# Patient Record
Sex: FEMALE | Race: WHITE | NOT HISPANIC OR LATINO | Employment: UNEMPLOYED | URBAN - NONMETROPOLITAN AREA
[De-identification: names, ages, dates, MRNs, and addresses within clinical notes are randomized per-mention and may not be internally consistent; named-entity substitution may affect disease eponyms.]

---

## 2019-08-23 RX ORDER — LEVOTHYROXINE SODIUM 0.1 MG/1
100 TABLET ORAL DAILY
COMMUNITY

## 2019-08-23 RX ORDER — ESZOPICLONE 2 MG/1
3 TABLET, FILM COATED ORAL NIGHTLY
COMMUNITY

## 2019-08-23 RX ORDER — LANSOPRAZOLE 15 MG/1
15 CAPSULE, DELAYED RELEASE ORAL DAILY
Status: ON HOLD | COMMUNITY
End: 2023-03-10

## 2019-08-23 RX ORDER — FLUOXETINE HYDROCHLORIDE 20 MG/1
20 CAPSULE ORAL DAILY
Status: ON HOLD | COMMUNITY
End: 2023-03-10

## 2019-08-23 RX ORDER — AMITRIPTYLINE HYDROCHLORIDE 50 MG/1
150 TABLET, FILM COATED ORAL NIGHTLY
COMMUNITY

## 2019-08-23 RX ORDER — METOPROLOL TARTRATE 50 MG/1
50 TABLET, FILM COATED ORAL 2 TIMES DAILY
COMMUNITY
End: 2019-09-04 | Stop reason: ALTCHOICE

## 2019-09-03 NOTE — PROGRESS NOTES
TRAMAINE Vaca  [facility]   Respiratory Disease Clinic  [address] [place] [address]  Phone: [phone]  Fax: [phone]     Marcella Carlisle is a 49 y.o. female.   CC:   Chief Complaint   Patient presents with   • follow up on chronic bronchitis        HPI: She is here for her annual visit with a history of frequent bronchitis in a non-smoker.  She takes Augmentin as needed.  She has not had any recent infections but does request a prescription for antibiotics should she need them throughout the year.  She also tends to get bacterial vaginosis when she takes antibiotics so she request a prescription for MetroGel to have on hand.  She is followed by a primary care provider in [place], [place].  She does not take flu or pneumonia vaccines.    The following portions of the patient's history were reviewed and updated as appropriate: allergies, current medications, past family history, past medical history, past social history, past surgical history and problem list.    Past Medical History:   Diagnosis Date   • Elevated cholesterol    • Fibromyalgia    • GERD (gastroesophageal reflux disease)    • Heartburn    • Stroke (CMS/HCC)        Family History   Problem Relation Age of Onset   • Cancer Mother    • Cancer Father    • Hypertension Father        Social History     Socioeconomic History   • Marital status:      Spouse name: Not on file   • Number of children: Not on file   • Years of education: Not on file   • Highest education level: Not on file   Tobacco Use   • Smoking status: Never Smoker   • Smokeless tobacco: Never Used   Substance and Sexual Activity   • Alcohol use: No     Frequency: Never   • Drug use: No   • Sexual activity: Defer       Review of Systems   Constitutional: Negative for appetite change, chills, fatigue and fever.   HENT: Negative for swollen glands, trouble swallowing and voice change.    Eyes: Negative for visual disturbance.   Respiratory:  "Negative for cough, shortness of breath and wheezing.    Cardiovascular: Negative for chest pain and leg swelling.   Gastrointestinal: Negative for abdominal distention, abdominal pain, nausea and vomiting.   Genitourinary: Negative.    Musculoskeletal: Negative for gait problem and myalgias.   Skin: Negative.    Neurological: Negative for weakness.   Hematological: Negative.    Psychiatric/Behavioral: The patient is not nervous/anxious.        /70   Pulse 60   Ht 160 cm (63\")   Wt 54.4 kg (120 lb)   SpO2 98% Comment: RA  Breastfeeding? No   BMI 21.26 kg/m²     Physical Exam   Constitutional: She is oriented to person, place, and time. She appears well-developed and well-nourished. No distress.   HENT:   Head: Normocephalic and atraumatic.   Eyes: Pupils are equal, round, and reactive to light. No scleral icterus.   Neck: Normal range of motion. Neck supple.   Cardiovascular: Normal rate, regular rhythm, S1 normal and S2 normal.   Pulmonary/Chest: Effort normal and breath sounds normal. No tachypnea. She has no wheezes. She has no rhonchi. She has no rales.   Abdominal: Soft. Bowel sounds are normal. She exhibits no distension.   Musculoskeletal: Normal range of motion. She exhibits no edema.   Lymphadenopathy:     She has no cervical adenopathy.   Neurological: She is alert and oriented to person, place, and time.   Skin: Skin is warm and dry. No rash noted. No cyanosis. Nails show no clubbing.   Psychiatric: She has a normal mood and affect. Her behavior is normal.   Vitals reviewed.      My interpretation of  Pulmonary Functions Testing: Spirometry is normal with FVC and FEV1 being supranormal.  When compared to pulmonary function testing from 2014, the patient's lung function has remained the same.    FEV1   Date Value Ref Range Status   09/04/2019 123% liters Final     FVC   Date Value Ref Range Status   09/04/2019 125% liters Final     FEV1/FVC   Date Value Ref Range Status   09/04/2019 80.79 % " Final       Marcella was seen today for follow up on chronic bronchitis.    Diagnoses and all orders for this visit:    Simple chronic bronchitis (CMS/HCC)  -     Pulmonary Function Test  -     amoxicillin-clavulanate (AUGMENTIN) 875-125 MG per tablet; Take 1 tablet by mouth 2 (Two) Times a Day for 10 days.  -     metroNIDAZOLE (METROGEL VAGINAL) 0.75 % vaginal gel; Insert  into the vagina 2 (Two) Times a Day.      Patient's Body mass index is 21.26 kg/m². BMI is within normal parameters. No follow-up required..      Follow-up/Plan: Augmentin as needed for bronchitis.  The patient would like to continue on annual visits.  Return to clinic in 1 year.    Mahin Salazar, APRN  9/4/2019  5:14 PM

## 2019-09-04 ENCOUNTER — OFFICE VISIT (OUTPATIENT)
Dept: PULMONOLOGY | Facility: CLINIC | Age: 49
End: 2019-09-04

## 2019-09-04 VITALS
DIASTOLIC BLOOD PRESSURE: 70 MMHG | WEIGHT: 120 LBS | OXYGEN SATURATION: 98 % | SYSTOLIC BLOOD PRESSURE: 116 MMHG | BODY MASS INDEX: 21.26 KG/M2 | HEART RATE: 60 BPM | HEIGHT: 63 IN

## 2019-09-04 DIAGNOSIS — J41.0 SIMPLE CHRONIC BRONCHITIS (HCC): Primary | ICD-10-CM

## 2019-09-04 PROBLEM — M79.7 FIBROMYALGIA: Status: ACTIVE | Noted: 2019-09-04

## 2019-09-04 LAB
FEV1/FVC: 80.79 %
FEV1: NORMAL LITERS
FVC VOL RESPIRATORY: NORMAL LITERS

## 2019-09-04 PROCEDURE — 94010 BREATHING CAPACITY TEST: CPT | Performed by: NURSE PRACTITIONER

## 2019-09-04 PROCEDURE — 99213 OFFICE O/P EST LOW 20 MIN: CPT | Performed by: NURSE PRACTITIONER

## 2019-09-04 RX ORDER — AMOXICILLIN AND CLAVULANATE POTASSIUM 875; 125 MG/1; MG/1
1 TABLET, FILM COATED ORAL 2 TIMES DAILY
Qty: 20 TABLET | Refills: 3 | Status: SHIPPED | OUTPATIENT
Start: 2019-09-04 | End: 2019-09-14

## 2019-09-04 RX ORDER — HYDROCODONE BITARTRATE AND ACETAMINOPHEN 10; 325 MG/1; MG/1
1 TABLET ORAL 4 TIMES DAILY
COMMUNITY
Start: 2019-09-03

## 2019-09-04 RX ORDER — ATENOLOL 50 MG/1
50 TABLET ORAL DAILY
Refills: 2 | Status: ON HOLD | COMMUNITY
Start: 2019-06-21 | End: 2023-03-10

## 2019-09-04 RX ORDER — METRONIDAZOLE 7.5 MG/G
GEL VAGINAL 2 TIMES DAILY
Qty: 1 TUBE | Refills: 3 | Status: SHIPPED | OUTPATIENT
Start: 2019-09-04 | End: 2020-09-09 | Stop reason: SDUPTHER

## 2019-09-04 ASSESSMENT — PULMONARY FUNCTION TESTS: FEV1/FVC: 80.79

## 2020-05-29 ENCOUNTER — OFFICE VISIT (OUTPATIENT)
Dept: PRIMARY CARE CLINIC | Age: 50
End: 2020-05-29
Payer: COMMERCIAL

## 2020-05-29 VITALS — OXYGEN SATURATION: 98 % | HEART RATE: 61 BPM | TEMPERATURE: 97.1 F

## 2020-05-29 DIAGNOSIS — R30.0 DYSURIA: ICD-10-CM

## 2020-05-29 DIAGNOSIS — R10.2 PELVIC PAIN: ICD-10-CM

## 2020-05-29 LAB
BILIRUBIN, POC: NEGATIVE
BLOOD URINE, POC: NORMAL
CLARITY, POC: CLEAR
COLOR, POC: YELLOW
GLUCOSE URINE, POC: NEGATIVE
KETONES, POC: NEGATIVE
LEUKOCYTE EST, POC: NORMAL
NITRITE, POC: NEGATIVE
PH, POC: 6
PROTEIN, POC: NEGATIVE
SPECIFIC GRAVITY, POC: 1.02
UROBILINOGEN, POC: 0.2

## 2020-05-29 PROCEDURE — 99213 OFFICE O/P EST LOW 20 MIN: CPT | Performed by: NURSE PRACTITIONER

## 2020-05-29 PROCEDURE — 81002 URINALYSIS NONAUTO W/O SCOPE: CPT | Performed by: NURSE PRACTITIONER

## 2020-05-29 RX ORDER — BUTALBITAL, ACETAMINOPHEN AND CAFFEINE 50; 325; 40 MG/1; MG/1; MG/1
1 TABLET ORAL EVERY 4 HOURS PRN
COMMUNITY

## 2020-05-29 RX ORDER — LEVOTHYROXINE SODIUM 0.05 MG/1
50 TABLET ORAL EVERY OTHER DAY
COMMUNITY

## 2020-05-29 RX ORDER — ASPIRIN 81 MG/1
81 TABLET, CHEWABLE ORAL DAILY
COMMUNITY

## 2020-05-29 RX ORDER — ATENOLOL 50 MG/1
50 TABLET ORAL DAILY
COMMUNITY
End: 2021-11-11

## 2020-05-29 RX ORDER — AMITRIPTYLINE HYDROCHLORIDE 150 MG/1
150 TABLET, FILM COATED ORAL NIGHTLY
COMMUNITY

## 2020-05-29 RX ORDER — OMEPRAZOLE 10 MG/1
10 CAPSULE, DELAYED RELEASE ORAL DAILY
COMMUNITY
End: 2022-09-02

## 2020-05-29 RX ORDER — PREGABALIN 50 MG/1
50 CAPSULE ORAL 3 TIMES DAILY
COMMUNITY
End: 2021-11-11

## 2020-05-29 RX ORDER — LEVOTHYROXINE SODIUM 0.07 MG/1
75 TABLET ORAL EVERY OTHER DAY
COMMUNITY

## 2020-05-29 RX ORDER — ESZOPICLONE 3 MG/1
3 TABLET, FILM COATED ORAL NIGHTLY
COMMUNITY
End: 2022-09-02

## 2020-05-29 RX ORDER — ESCITALOPRAM OXALATE 20 MG/1
20 TABLET ORAL DAILY
COMMUNITY
End: 2022-07-06

## 2020-05-29 RX ORDER — CIPROFLOXACIN 500 MG/1
500 TABLET, FILM COATED ORAL 2 TIMES DAILY
Qty: 6 TABLET | Refills: 0 | Status: SHIPPED | OUTPATIENT
Start: 2020-05-29 | End: 2020-06-01

## 2020-05-29 ASSESSMENT — ENCOUNTER SYMPTOMS
SINUS PRESSURE: 0
VOMITING: 0
RHINORRHEA: 0
NAUSEA: 0
COUGH: 0
TROUBLE SWALLOWING: 0
SORE THROAT: 0
CONSTIPATION: 0
DIARRHEA: 0
SHORTNESS OF BREATH: 0

## 2020-05-29 NOTE — PATIENT INSTRUCTIONS
Increase fluids  Avoid tub baths  May take azo for urinary discomfort - if continues may need to see urology. Follow up with your doctor or OB/Gyn  Return for any problems or concerns.

## 2020-05-29 NOTE — PROGRESS NOTES
Results for orders placed or performed in visit on 05/29/20   POCT Urinalysis no Micro   Result Value Ref Range    Color, UA Yellow     Clarity, UA Clear     Glucose, UA POC Negative     Bilirubin, UA Negative     Ketones, UA Negative     Spec Grav, UA 1.020     Blood, UA POC Trace-intact     pH, UA 6.0     Protein, UA POC Negative     Urobilinogen, UA 0.2     Leukocytes, UA Trace     Nitrite, UA Negative

## 2020-05-29 NOTE — PROGRESS NOTES
Cardiovascular:      Rate and Rhythm: Normal rate and regular rhythm. Pulses: Normal pulses. Heart sounds: Normal heart sounds. Pulmonary:      Effort: Pulmonary effort is normal.      Breath sounds: Normal breath sounds. Abdominal:      General: Bowel sounds are normal. There is no distension. Palpations: Abdomen is soft. Tenderness: There is abdominal tenderness (suprapubic). There is no guarding. Skin:     General: Skin is warm. Neurological:      Mental Status: She is alert. Pulse 61   Temp 97.1 °F (36.2 °C) (Infrared)   SpO2 98%   Results for orders placed or performed in visit on 05/29/20   POCT Urinalysis no Micro   Result Value Ref Range    Color, UA Yellow     Clarity, UA Clear     Glucose, UA POC Negative     Bilirubin, UA Negative     Ketones, UA Negative     Spec Grav, UA 1.020     Blood, UA POC Trace-intact     pH, UA 6.0     Protein, UA POC Negative     Urobilinogen, UA 0.2     Leukocytes, UA Trace     Nitrite, UA Negative        Assessment/ Plan     ASSESSMENT:         Diagnosis Orders   1. Dysuria  POCT Urinalysis no Micro    cipro 500mg bid for 3 days. 2. Pelvic pain  POCT Urinalysis no Micro    Urinalysis was negative except for some trace of blood and leukocytes in urine. Given she has had pelvic pain for a month and urinary symptoms started 3 days ago. I offered to do a pelvic exam and get possible CAT scan of her abdomen and pelvis to r/o kidney stone or pelvic problem. Patient declined stating that she will go see her OB/GYN or PCP. Advised her that she could try Azo over-the-counter for her dysuria. I put her on cipro for 3 days and will get urine culture. Stressed the importance of her following up if her symptoms are not improving. PLAN:     Return if symptoms worsen or fail to improve. Patient given educational materials - see patient instructions. Discussed use, benefit, and side effects of prescribed medications.   All patient questions answered. Pt voiced understanding. Patient agreed with treatment plan.  Follow up as needed      Electronically signed by DINA Everett on 5/29/2020 at 9:27 AM

## 2020-05-31 LAB
ORGANISM: ABNORMAL
URINE CULTURE, ROUTINE: ABNORMAL
URINE CULTURE, ROUTINE: ABNORMAL

## 2020-06-02 ENCOUNTER — OFFICE VISIT (OUTPATIENT)
Dept: OBGYN | Age: 50
End: 2020-06-02
Payer: COMMERCIAL

## 2020-06-02 VITALS
WEIGHT: 125 LBS | SYSTOLIC BLOOD PRESSURE: 90 MMHG | TEMPERATURE: 98.3 F | HEIGHT: 63 IN | BODY MASS INDEX: 22.15 KG/M2 | HEART RATE: 67 BPM | DIASTOLIC BLOOD PRESSURE: 69 MMHG

## 2020-06-02 PROCEDURE — 99203 OFFICE O/P NEW LOW 30 MIN: CPT | Performed by: OBSTETRICS & GYNECOLOGY

## 2020-06-02 RX ORDER — ESTRADIOL 0.1 MG/G
1 CREAM VAGINAL DAILY
Qty: 1 TUBE | Refills: 0 | Status: SHIPPED | OUTPATIENT
Start: 2020-06-02 | End: 2020-09-25

## 2020-06-02 SDOH — HEALTH STABILITY: MENTAL HEALTH: HOW OFTEN DO YOU HAVE A DRINK CONTAINING ALCOHOL?: NEVER

## 2020-07-09 ENCOUNTER — OFFICE VISIT (OUTPATIENT)
Dept: OBGYN | Age: 50
End: 2020-07-09
Payer: COMMERCIAL

## 2020-07-09 VITALS
DIASTOLIC BLOOD PRESSURE: 68 MMHG | HEIGHT: 63 IN | WEIGHT: 120 LBS | HEART RATE: 74 BPM | TEMPERATURE: 98.3 F | BODY MASS INDEX: 21.26 KG/M2 | SYSTOLIC BLOOD PRESSURE: 112 MMHG

## 2020-07-09 PROCEDURE — 99213 OFFICE O/P EST LOW 20 MIN: CPT | Performed by: OBSTETRICS & GYNECOLOGY

## 2020-07-09 RX ORDER — CONJUGATED ESTROGENS 0.62 MG/G
0.5 CREAM VAGINAL
Qty: 1 TUBE | Refills: 5 | Status: SHIPPED | OUTPATIENT
Start: 2020-07-09 | End: 2022-01-25

## 2020-07-09 ASSESSMENT — ENCOUNTER SYMPTOMS
RESPIRATORY NEGATIVE: 1
GASTROINTESTINAL NEGATIVE: 1
EYES NEGATIVE: 1

## 2020-07-09 NOTE — PROGRESS NOTES
IAdriana RN, am scribing for and in the presence of Dr. Joseph Escobar 7/9/2020/3:17 PM/sign    Subjective:      Patient ID: Ronald Perez is a 44 y.o. female. CASEY Chen is here for a 6 weeks follow up. She has been using Estrace vaginal cream. Has noticed an improvement to vagina. However, every time she uses the cream she has dizziness. Ronald Perez is a 44 y.o. female with the following history as recorded in [facility]: There are no active problems to display for this patient. Current Outpatient Medications   Medication Sig Dispense Refill    conjugated estrogens (PREMARIN) 0.625 MG/GM vaginal cream Place 0.5 g vaginally Twice a Week 1 Tube 5    estradiol (ESTRACE VAGINAL) 0.1 MG/GM vaginal cream Place 1 g vaginally daily 1 Tube 0    omeprazole (PRILOSEC) 10 MG delayed release capsule Take 10 mg by mouth daily      escitalopram (LEXAPRO) 20 MG tablet Take 20 mg by mouth daily      levothyroxine (SYNTHROID) 50 MCG tablet Take 50 mcg by mouth every other day      levothyroxine (SYNTHROID) 75 MCG tablet Take 75 mcg by mouth every other day      atenolol (TENORMIN) 50 MG tablet Take 50 mg by mouth daily      amitriptyline (ELAVIL) 150 MG tablet Take 150 mg by mouth nightly      pregabalin (LYRICA) 50 MG capsule Take 50 mg by mouth 3 times daily.  eszopiclone (ESZOPICLONE) 3 MG TABS Take 3 mg by mouth nightly.  butalbital-acetaminophen-caffeine (FIORICET, ESGIC) -40 MG per tablet Take 1 tablet by mouth every 4 hours as needed for Headaches      aspirin 81 MG chewable tablet Take 81 mg by mouth daily       No current facility-administered medications for this visit.       Allergies: Clindamycin/lincomycin and Sulfa antibiotics  Past Medical History:   Diagnosis Date    Chronic bronchitis (HCC)     Fibromyalgia     Hyperthyroidism     Mitral valve prolapse     MVP (mitral valve prolapse)     Stroke [facility])      Past Surgical History:   Procedure Laterality Date    oriented to person, place, and time. Motor: No abnormal muscle tone. Coordination: Coordination normal.   Psychiatric:         Behavior: Behavior normal.         Assessment:       Diagnosis Orders   1. Postmenopausal atrophic vaginitis             Plan:      MEDICATIONS:  Orders Placed This Encounter   Medications    conjugated estrogens (PREMARIN) 0.625 MG/GM vaginal cream     Sig: Place 0.5 g vaginally Twice a Week     Dispense:  1 Tube     Refill:  5       ORDERS:  No orders of the defined types were placed in this encounter. Will try Premarin cream instead of Estrace  F/u yearly or prn  All questions answered. Eric Jones MD, personally performed services described in this document as scribed by Theresa Uriarte RN in my presence, and it is both accurate and complete.

## 2020-09-06 NOTE — PROGRESS NOTES
TRAMAINE Vaca  [facility]   Respiratory Disease Clinic  [address] [place] [address]  Phone: [phone]  Fax: [phone]     Marcella Carlisle is a 50 y.o. female.   CC:   Chief Complaint   Patient presents with   • Bronchitis        HPI: She has required antibiotics a couple of times this year for bronchitis.  Currently she is doing well.  She was prescribed Augmentin last year but she would like to go back to plain amoxicillin.  She requires MetroGel with any round of antibiotics so that will be refilled for her today.  She is currently furloughed from work and has been staying at home and practicing social distancing.  She is a never smoker.  She follows with a primary care provider in [facility] and she does not take vaccines.    The following portions of the patient's history were reviewed and updated as appropriate: allergies, current medications, past family history, past medical history, past social history, past surgical history and problem list.    Past Medical History:   Diagnosis Date   • Elevated cholesterol    • Fibromyalgia    • GERD (gastroesophageal reflux disease)    • Heartburn    • Stroke (CMS/HCC)        Family History   Problem Relation Age of Onset   • Cancer Mother    • Cancer Father    • Hypertension Father        Social History     Socioeconomic History   • Marital status:      Spouse name: Not on file   • Number of children: Not on file   • Years of education: Not on file   • Highest education level: Not on file   Tobacco Use   • Smoking status: Never Smoker   • Smokeless tobacco: Never Used   Substance and Sexual Activity   • Alcohol use: No     Frequency: Never   • Drug use: No   • Sexual activity: Defer       Review of Systems   Constitutional: Negative for appetite change, chills, fatigue and fever.   HENT: Negative for swollen glands, trouble swallowing and voice change.    Eyes: Negative for visual disturbance.   Respiratory:  "Positive for cough. Negative for shortness of breath and wheezing.    Cardiovascular: Negative for chest pain and leg swelling.   Gastrointestinal: Negative for abdominal distention, abdominal pain, nausea and vomiting.   Genitourinary: Negative.    Musculoskeletal: Negative for gait problem and myalgias.   Skin: Negative.    Neurological: Negative for weakness.   Hematological: Negative.    Psychiatric/Behavioral: The patient is not nervous/anxious.        /70   Pulse 68   Temp 97.9 °F (36.6 °C)   Ht 160 cm (63\")   Wt 54 kg (119 lb)   SpO2 98% Comment: RA  BMI 21.08 kg/m²     Physical Exam   Constitutional: She is oriented to person, place, and time. She appears well-developed and well-nourished. No distress.   HENT:   Head: Normocephalic and atraumatic.   Wearing mask   Eyes: Pupils are equal, round, and reactive to light. No scleral icterus.   Neck: Normal range of motion. Neck supple.   Cardiovascular: Normal rate, regular rhythm, S1 normal and S2 normal.   Pulmonary/Chest: Effort normal and breath sounds normal. No tachypnea. She has no wheezes. She has no rhonchi. She has no rales.   Abdominal: Soft. Bowel sounds are normal. She exhibits no distension.   Musculoskeletal: Normal range of motion. She exhibits no edema.   Lymphadenopathy:     She has no cervical adenopathy.   Neurological: She is alert and oriented to person, place, and time.   Skin: Skin is warm and dry. No rash noted. No cyanosis. Nails show no clubbing.   Psychiatric: She has a normal mood and affect. Her behavior is normal.   Vitals reviewed.        Marcella was seen today for bronchitis.    Diagnoses and all orders for this visit:    Simple chronic bronchitis (CMS/HCC)  -     amoxicillin (AMOXIL) 500 MG capsule; Take 1 capsule by mouth 2 (Two) Times a Day for 10 days.    Vaginosis  -     metroNIDAZOLE (METROGEL VAGINAL) 0.75 % vaginal gel; Insert  into the vagina 2 (Two) Times a Day.    Other orders  -     nystatin (MYCOSTATIN) 862715 " UNIT/ML suspension; Take 5 mL by mouth 4 (Four) Times a Day.      Patient's Body mass index is 21.08 kg/m². BMI is within normal parameters. No follow-up required.      Follow-up/Plan: Prescriptions for amoxicillin, MetroGel and nystatin were sent to the patient's pharmacy.  She would like to continue to be seen on an annual basis.  Follow-up in 1 year.    Mahin Salazar, TRAMAINE  9/9/2020  16:48

## 2020-09-09 ENCOUNTER — OFFICE VISIT (OUTPATIENT)
Dept: PULMONOLOGY | Facility: CLINIC | Age: 50
End: 2020-09-09

## 2020-09-09 VITALS
TEMPERATURE: 97.9 F | HEART RATE: 68 BPM | DIASTOLIC BLOOD PRESSURE: 70 MMHG | WEIGHT: 119 LBS | SYSTOLIC BLOOD PRESSURE: 102 MMHG | HEIGHT: 63 IN | OXYGEN SATURATION: 98 % | BODY MASS INDEX: 21.09 KG/M2

## 2020-09-09 DIAGNOSIS — J41.0 SIMPLE CHRONIC BRONCHITIS (HCC): Primary | ICD-10-CM

## 2020-09-09 DIAGNOSIS — N76.0 VAGINOSIS: ICD-10-CM

## 2020-09-09 PROCEDURE — 99214 OFFICE O/P EST MOD 30 MIN: CPT | Performed by: NURSE PRACTITIONER

## 2020-09-09 RX ORDER — METRONIDAZOLE 7.5 MG/G
GEL VAGINAL 2 TIMES DAILY
Qty: 1 TUBE | Refills: 3 | Status: SHIPPED | OUTPATIENT
Start: 2020-09-09 | End: 2021-09-22 | Stop reason: SDUPTHER

## 2020-09-09 RX ORDER — AMOXICILLIN 500 MG/1
500 CAPSULE ORAL 2 TIMES DAILY
Qty: 20 CAPSULE | Refills: 3 | Status: SHIPPED | OUTPATIENT
Start: 2020-09-09 | End: 2020-09-19

## 2020-09-25 RX ORDER — ESTRADIOL 0.1 MG/G
CREAM VAGINAL
Qty: 42.5 G | Refills: 0 | Status: SHIPPED | OUTPATIENT
Start: 2020-09-25 | End: 2020-11-04

## 2021-02-08 ENCOUNTER — TRANSCRIBE ORDERS (OUTPATIENT)
Dept: ADMINISTRATIVE | Facility: HOSPITAL | Age: 51
End: 2021-02-08

## 2021-02-08 DIAGNOSIS — E28.39 OVARIAN FAILURE: ICD-10-CM

## 2021-02-08 DIAGNOSIS — G44.89 CHRONIC MIXED HEADACHE SYNDROME: Primary | ICD-10-CM

## 2021-02-16 ENCOUNTER — APPOINTMENT (OUTPATIENT)
Dept: BONE DENSITY | Facility: HOSPITAL | Age: 51
End: 2021-02-16

## 2021-02-16 ENCOUNTER — APPOINTMENT (OUTPATIENT)
Dept: CT IMAGING | Facility: HOSPITAL | Age: 51
End: 2021-02-16

## 2021-02-25 ENCOUNTER — HOSPITAL ENCOUNTER (OUTPATIENT)
Dept: BONE DENSITY | Facility: HOSPITAL | Age: 51
Discharge: HOME | End: 2021-02-25

## 2021-02-25 ENCOUNTER — HOSPITAL ENCOUNTER (OUTPATIENT)
Dept: CT IMAGING | Facility: HOSPITAL | Age: 51
Discharge: HOME | End: 2021-02-25

## 2021-02-25 ENCOUNTER — HOSPITAL ENCOUNTER (OUTPATIENT)
Dept: GENERAL RADIOLOGY | Facility: HOSPITAL | Age: 51
Discharge: HOME | End: 2021-02-25

## 2021-02-25 DIAGNOSIS — G44.89 CHRONIC MIXED HEADACHE SYNDROME: ICD-10-CM

## 2021-02-25 DIAGNOSIS — M54.2 CERVICALGIA: ICD-10-CM

## 2021-02-25 DIAGNOSIS — E28.39 OVARIAN FAILURE: ICD-10-CM

## 2021-02-25 PROCEDURE — 77080 DXA BONE DENSITY AXIAL: CPT

## 2021-02-25 PROCEDURE — 72040 X-RAY EXAM NECK SPINE 2-3 VW: CPT

## 2021-02-25 PROCEDURE — 70450 CT HEAD/BRAIN W/O DYE: CPT

## 2021-04-08 ENCOUNTER — TRANSCRIBE ORDERS (OUTPATIENT)
Dept: ADMINISTRATIVE | Facility: HOSPITAL | Age: 51
End: 2021-04-08

## 2021-04-08 DIAGNOSIS — G89.29 OTHER CHRONIC PAIN: Primary | ICD-10-CM

## 2021-04-23 ENCOUNTER — HOSPITAL ENCOUNTER (OUTPATIENT)
Dept: MRI IMAGING | Facility: HOSPITAL | Age: 51
Discharge: HOME | End: 2021-04-23

## 2021-04-23 DIAGNOSIS — G89.29 OTHER CHRONIC PAIN: ICD-10-CM

## 2021-04-23 PROCEDURE — 72141 MRI NECK SPINE W/O DYE: CPT

## 2021-04-23 PROCEDURE — 72146 MRI CHEST SPINE W/O DYE: CPT

## 2021-09-22 ENCOUNTER — OFFICE VISIT (OUTPATIENT)
Dept: PULMONOLOGY | Facility: CLINIC | Age: 51
End: 2021-09-22

## 2021-09-22 VITALS
WEIGHT: 124.2 LBS | DIASTOLIC BLOOD PRESSURE: 64 MMHG | SYSTOLIC BLOOD PRESSURE: 110 MMHG | OXYGEN SATURATION: 100 % | HEART RATE: 53 BPM | HEIGHT: 63 IN | BODY MASS INDEX: 22.01 KG/M2

## 2021-09-22 DIAGNOSIS — J41.0 SIMPLE CHRONIC BRONCHITIS (HCC): Primary | Chronic | ICD-10-CM

## 2021-09-22 DIAGNOSIS — N76.0 VAGINOSIS: ICD-10-CM

## 2021-09-22 PROCEDURE — 99213 OFFICE O/P EST LOW 20 MIN: CPT | Performed by: NURSE PRACTITIONER

## 2021-09-22 RX ORDER — ESOMEPRAZOLE MAGNESIUM 40 MG/1
40 CAPSULE, DELAYED RELEASE ORAL DAILY
COMMUNITY
Start: 2021-07-31

## 2021-09-22 RX ORDER — METOPROLOL TARTRATE 50 MG/1
50 TABLET, FILM COATED ORAL 3 TIMES DAILY
COMMUNITY
Start: 2021-07-25

## 2021-09-22 RX ORDER — DESVENLAFAXINE SUCCINATE 50 MG/1
TABLET, EXTENDED RELEASE ORAL
COMMUNITY
Start: 2021-08-03

## 2021-09-22 RX ORDER — AMOXICILLIN 500 MG/1
1000 CAPSULE ORAL 3 TIMES DAILY
Qty: 30 CAPSULE | Refills: 3 | Status: SHIPPED | OUTPATIENT
Start: 2021-09-22 | End: 2022-09-21 | Stop reason: SDUPTHER

## 2021-09-22 RX ORDER — LEVOTHYROXINE SODIUM 0.05 MG/1
TABLET ORAL
COMMUNITY
Start: 2021-07-29

## 2021-09-22 RX ORDER — METRONIDAZOLE 7.5 MG/G
GEL VAGINAL 2 TIMES DAILY
Qty: 70 G | Refills: 3 | Status: SHIPPED | OUTPATIENT
Start: 2021-09-22 | End: 2022-09-21 | Stop reason: SDUPTHER

## 2021-09-22 NOTE — PROGRESS NOTES
"Chief Complaint  Bronchitis    Subjective    History of Present Illness      Marcella Carlisle presents to [facility] for:    History of Present Illness   Annual appointment for management of simple chronic bronchitis. She reports that she required antibiotics only one time this past year. She is a never smoker. She is not on any inhaler therapy. Her PCP is in [facility]. She does not take vaccines.  Objective   Vital Signs:   /64   Pulse 53   Ht 160 cm (63\")   Wt 56.3 kg (124 lb 3.2 oz)   SpO2 100% Comment: ra  BMI 22.00 kg/m²     Physical Exam  Vitals reviewed.   Constitutional:       General: She is not in acute distress.     Appearance: Normal appearance.   HENT:      Head: Normocephalic and atraumatic.      Comments: Wearing a mask  Cardiovascular:      Rate and Rhythm: Normal rate and regular rhythm.   Pulmonary:      Effort: Pulmonary effort is normal.      Breath sounds: Normal breath sounds.   Musculoskeletal:      Cervical back: Normal range of motion.   Skin:     General: Skin is warm and dry.   Neurological:      Mental Status: She is alert and oriented to person, place, and time.   Psychiatric:         Mood and Affect: Mood normal.         Behavior: Behavior normal.        Result Review :   Results for orders placed in visit on 09/04/19    Pulmonary Function Test                   Assessment and Plan      Diagnoses and all orders for this visit:    1. Simple chronic bronchitis (HCC) (Primary)  Comments:  Stable. She keeps amoxicillin on hand to use as needed. Anytime she takes antibiotics she requires MetroGel and nystatin.  Orders:  -     nystatin (MYCOSTATIN) [id number] UNIT/ML suspension; Take 5 mL by mouth 4 (Four) Times a Day.  Dispense: 60 mL; Refill: 3  -     amoxicillin (AMOXIL) 500 MG capsule; Take 2 capsules by mouth 3 (Three) Times a Day.  Dispense: 30 capsule; Refill: 3    2. Vaginosis  Comments:  Use MetroGel as " needed.  Orders:  -     metroNIDAZOLE (METROGEL VAGINAL) 0.75 % vaginal gel; Insert  into the vagina 2 (Two) Times a Day.  Dispense: 70 g; Refill: 3        TRAMAINE Vaca  9/22/2021  19:16 CDT    Follow Up   Return in about 1 year (around 9/22/2022).    Patient was given instructions and counseling regarding her condition or for health maintenance advice. Please see specific information pulled into the AVS if appropriate.

## 2021-11-11 ENCOUNTER — OFFICE VISIT (OUTPATIENT)
Dept: PRIMARY CARE CLINIC | Age: 51
End: 2021-11-11

## 2021-11-11 VITALS
SYSTOLIC BLOOD PRESSURE: 92 MMHG | HEART RATE: 61 BPM | OXYGEN SATURATION: 94 % | BODY MASS INDEX: 22.28 KG/M2 | DIASTOLIC BLOOD PRESSURE: 60 MMHG | WEIGHT: 125.75 LBS | TEMPERATURE: 97.4 F | HEIGHT: 63 IN

## 2021-11-11 DIAGNOSIS — E55.9 VITAMIN D DEFICIENCY: Primary | ICD-10-CM

## 2021-11-11 DIAGNOSIS — Z00.00 WELL ADULT EXAM: ICD-10-CM

## 2021-11-11 PROCEDURE — 99999 PR OFFICE/OUTPT VISIT,PROCEDURE ONLY: CPT | Performed by: NURSE PRACTITIONER

## 2021-11-11 RX ORDER — PROPAFENONE HYDROCHLORIDE 150 MG/1
150 TABLET, FILM COATED ORAL EVERY 8 HOURS
COMMUNITY
End: 2022-09-01

## 2021-11-11 RX ORDER — GABAPENTIN 100 MG/1
CAPSULE ORAL
COMMUNITY

## 2021-11-11 ASSESSMENT — PATIENT HEALTH QUESTIONNAIRE - PHQ9
SUM OF ALL RESPONSES TO PHQ QUESTIONS 1-9: 0
2. FEELING DOWN, DEPRESSED OR HOPELESS: 0
SUM OF ALL RESPONSES TO PHQ9 QUESTIONS 1 & 2: 0
SUM OF ALL RESPONSES TO PHQ QUESTIONS 1-9: 0
1. LITTLE INTEREST OR PLEASURE IN DOING THINGS: 0
SUM OF ALL RESPONSES TO PHQ QUESTIONS 1-9: 0

## 2021-11-11 ASSESSMENT — ENCOUNTER SYMPTOMS
BACK PAIN: 0
RESPIRATORY NEGATIVE: 1

## 2021-11-11 NOTE — PROGRESS NOTES
Sandra Hoskins (:  1970) is a 46 y.o. female,Established patient, here for evaluation of the following chief complaint(s):  No chief complaint on file. ASSESSMENT/PLAN:    ICD-10-CM    1. Vitamin D deficiency  E55.9 Vitamin D 25 Hydroxy   2. Well adult exam  Z00.00 TSH without Reflex     T4, Free     Microalbumin / Creatinine Urine Ratio     Hemoglobin A1C     CBC Auto Differential     Comprehensive Metabolic Panel       No follow-ups on file. SUBJECTIVE/OBJECTIVE:  HPI    Patient here to talk about dad  He is staying at the assisted living    She reports she would like a DNR  As he is not able to make decisions  78years old  She is the power of     BP 92/60 (Site: Right Upper Arm, Position: Sitting, Cuff Size: Large Adult)   Pulse 61   Temp 97.4 °F (36.3 °C) (Temporal)   Ht 5' 3\" (1.6 m)   Wt 125 lb 12 oz (57 kg)   SpO2 94%   BMI 22.28 kg/m²   Review of Systems   Constitutional: Negative for activity change, appetite change, fatigue and fever. HENT: Negative for congestion and postnasal drip. Respiratory: Negative. Cardiovascular: Negative for chest pain, palpitations and leg swelling. Genitourinary: Negative for difficulty urinating. Musculoskeletal: Negative for arthralgias and back pain. Skin: Negative for pallor. Psychiatric/Behavioral: Negative for behavioral problems and sleep disturbance. The patient is not nervous/anxious and is not hyperactive. Physical Exam  Vitals reviewed. Constitutional:       General: She is not in acute distress. Appearance: Normal appearance. She is not ill-appearing. HENT:      Head: Normocephalic. Right Ear: Tympanic membrane normal. There is no impacted cerumen. Left Ear: Tympanic membrane normal. There is no impacted cerumen. Nose: No rhinorrhea. Mouth/Throat:      Pharynx: No posterior oropharyngeal erythema. Eyes:      General:         Right eye: No discharge. Left eye: No discharge. Cardiovascular:      Rate and Rhythm: Normal rate and regular rhythm. Pulses: Normal pulses. Heart sounds: No murmur heard. Pulmonary:      Effort: Pulmonary effort is normal.      Breath sounds: Normal breath sounds. No wheezing or rhonchi. Abdominal:      General: Bowel sounds are normal.   Skin:     General: Skin is warm. Findings: No rash. Neurological:      General: No focal deficit present. Mental Status: She is alert and oriented to person, place, and time. Psychiatric:         Mood and Affect: Mood normal.         Behavior: Behavior normal.                   An electronic signature was used to authenticate this note.     --DINA Kaur

## 2022-01-25 RX ORDER — ESTRADIOL 0.1 MG/G
1 CREAM VAGINAL
Qty: 42.5 G | Refills: 5 | Status: SHIPPED | OUTPATIENT
Start: 2022-01-27 | End: 2022-07-29

## 2022-07-06 ENCOUNTER — OFFICE VISIT (OUTPATIENT)
Dept: OBGYN CLINIC | Age: 52
End: 2022-07-06
Payer: MEDICAID

## 2022-07-06 VITALS
WEIGHT: 127 LBS | SYSTOLIC BLOOD PRESSURE: 89 MMHG | HEIGHT: 63 IN | HEART RATE: 98 BPM | BODY MASS INDEX: 22.5 KG/M2 | DIASTOLIC BLOOD PRESSURE: 68 MMHG

## 2022-07-06 DIAGNOSIS — Z12.31 ENCOUNTER FOR SCREENING MAMMOGRAM FOR MALIGNANT NEOPLASM OF BREAST: ICD-10-CM

## 2022-07-06 DIAGNOSIS — N95.2 POSTMENOPAUSAL ATROPHIC VAGINITIS: ICD-10-CM

## 2022-07-06 DIAGNOSIS — Z01.419 WELL FEMALE EXAM WITH ROUTINE GYNECOLOGICAL EXAM: Primary | ICD-10-CM

## 2022-07-06 PROCEDURE — 99396 PREV VISIT EST AGE 40-64: CPT | Performed by: OBSTETRICS & GYNECOLOGY

## 2022-07-06 RX ORDER — ESZOPICLONE 3 MG/1
TABLET, FILM COATED ORAL
COMMUNITY

## 2022-07-06 RX ORDER — METOPROLOL TARTRATE 50 MG/1
TABLET, FILM COATED ORAL
COMMUNITY

## 2022-07-06 RX ORDER — HYDROCODONE BITARTRATE AND ACETAMINOPHEN 10; 325 MG/1; MG/1
TABLET ORAL
COMMUNITY
Start: 2022-06-27

## 2022-07-06 RX ORDER — DESVENLAFAXINE 50 MG/1
TABLET, EXTENDED RELEASE ORAL
COMMUNITY
Start: 2022-06-23

## 2022-07-06 ASSESSMENT — ENCOUNTER SYMPTOMS
EYES NEGATIVE: 1
GASTROINTESTINAL NEGATIVE: 1
RESPIRATORY NEGATIVE: 1

## 2022-07-06 NOTE — PATIENT INSTRUCTIONS
Patient Education        Breast Self-Exam: Care Instructions  Your Care Instructions     A breast self-exam is when you check your breasts for lumps or changes. This regular exam helps you learn how your breasts normally look and feel. Mostbreast problems or changes are not because of cancer. Breast self-exam is not a substitute for a mammogram. Having regular breast exams by your doctor and regular mammograms improve your chances of finding anyproblems with your breasts. Some women set a time each month to do a step-by-step breast self-exam. Other women like a less formal system. They might look at their breasts as they brushtheir teeth, or feel their breasts once in a while in the shower. If you notice a change in your breast, tell your doctor. Follow-up care is a key part of your treatment and safety. Be sure to make and go to all appointments, and call your doctor if you are having problems. It's also a good idea to know your test results and keep alist of the medicines you take. How do you do a breast self-exam?   The best time to examine your breasts is usually one week after your menstrual period begins. Your breasts should not be tender then. If you do not have periods, you might do your exam on a day of the month that is easy to remember.  To examine your breasts:  ? Remove all your clothes above the waist and lie down. When you are lying down, your breast tissue spreads evenly over your chest wall, which makes it easier to feel all your breast tissue. ? Use the pads--not the fingertips--of the 3 middle fingers of your left hand to check your right breast. Move your fingers slowly in small coin-sized circles that overlap. ? Use three levels of pressure to feel of all your breast tissue. Use light pressure to feel the tissue close to the skin surface. Use medium pressure to feel a little deeper. Use firm pressure to feel your tissue close to your breastbone and ribs.  Use each pressure level to feel

## 2022-07-06 NOTE — PROGRESS NOTES
I, Adriana Negrete RN, am scribing for and in the presence of Dr. Amanda Montenegro 2022/2:58 PM/sign         2022    Aiden Sun (:  1970) is a 45 y.o. female, here for a preventive medicine evaluation. She states she is due for a mammogram. She is using the vaginal estrogen cream 2 to 3 times weekly    There is no problem list on file for this patient. Review of Systems   Constitutional: Negative. HENT: Negative. Eyes: Negative. Respiratory: Negative. Cardiovascular: Negative. Gastrointestinal: Negative. Genitourinary: Negative for difficulty urinating, dyspareunia, dysuria, enuresis, frequency, hematuria, menstrual problem, pelvic pain, urgency and vaginal discharge. Musculoskeletal: Negative. Skin: Negative. Neurological: Negative. Psychiatric/Behavioral: Negative. Prior to Visit Medications    Medication Sig Taking?  Authorizing Provider   desvenlafaxine succinate (PRISTIQ) 50 MG TB24 extended release tablet TAKE 1 TABLET BY MOUTH EVERY DAY DX:F32.9 Yes Historical Provider, MD   HYDROcodone-acetaminophen (NORCO)  MG per tablet TAKE 1 TABLET BY MOUTH FOUR TIMES A DAY AS NEEDED FOR 30 DAYS Yes Historical Provider, MD   metoprolol tartrate (LOPRESSOR) 50 MG tablet metoprolol tartrate 50 mg tablet   TAKE ONE TABLET THREE TIMES DAILY FOR BLOOD PRESSURE Yes Historical Provider, MD   eszopiclone (LUNESTA) 3 MG TABS eszopiclone 3 mg tablet   TAKE 1 TABLET BY MOUTH EVERY NIGHT AS NEEDED FOR SLEEP Yes Historical Provider, MD   estradiol (ESTRACE) 0.1 MG/GM vaginal cream Place 1 g vaginally Twice a Week Yes Joyce Barraza MD   gabapentin (NEURONTIN) 100 MG capsule gabapentin 100 mg capsule   Take 1 capsule three times daily Yes Historical Provider, MD   omeprazole (PRILOSEC) 10 MG delayed release capsule Take 10 mg by mouth daily Yes Historical Provider, MD   levothyroxine (SYNTHROID) 50 MCG tablet Take 50 mcg by mouth every other day Yes Historical Provider, MD levothyroxine (SYNTHROID) 75 MCG tablet Take 75 mcg by mouth every other day Yes Historical Provider, MD   amitriptyline (ELAVIL) 150 MG tablet Take 150 mg by mouth nightly Yes Historical Provider, MD   eszopiclone (ESZOPICLONE) 3 MG TABS Take 3 mg by mouth nightly.  Yes Historical Provider, MD   butalbital-acetaminophen-caffeine (FIORICET, ESGIC) -40 MG per tablet Take 1 tablet by mouth every 4 hours as needed for Headaches Yes Historical Provider, MD   aspirin 81 MG chewable tablet Take 81 mg by mouth daily Yes Historical Provider, MD   propafenone (RYTHMOL) 150 MG tablet Take 150 mg by mouth every 8 hours  Patient not taking: Reported on 7/6/2022  Historical Provider, MD        Allergies   Allergen Reactions    Clindamycin/Lincomycin     Sulfa Antibiotics        Past Medical History:   Diagnosis Date    [facility] bronchitis ([facility] [address]     Fibromyalgia     Hyperthyroidism     Mitral valve prolapse     MVP (mitral valve prolapse)     Stroke [facility])        Past Surgical History:   Procedure Laterality Date    CHOLECYSTECTOMY      HYSTERECTOMY (CERVIX STATUS UNKNOWN)      and removal of ovaries       Social History     Socioeconomic History    Marital status:      Spouse name: Not on file    Number of children: Not on file    Years of education: Not on file    Highest education level: Not on file   Occupational History    Not on file   Tobacco Use    Smoking status: Never Smoker    Smokeless tobacco: Never Used   Vaping Use    Vaping Use: Never used   Substance and Sexual Activity    Alcohol use: Never    Drug use: Never    Sexual activity: Not Currently   Other Topics Concern    Not on file   Social History Narrative    Not on file     Social Determinants of Health     Financial Resource Strain:     Difficulty of Paying Living Expenses: Not on file   Food Insecurity:     Worried About Running Out of Food in the Last Year: Not on file    Alfredo of Food in the Last Year: Not on file Transportation Needs:     Lack of Transportation (Medical): Not on file    Lack of Transportation (Non-Medical): Not on file   Physical Activity:     Days of Exercise per Week: Not on file    Minutes of Exercise per Session: Not on file   Stress:     Feeling of Stress : Not on file   Social Connections:     Frequency of Communication with Friends and Family: Not on file    Frequency of Social Gatherings with Friends and Family: Not on file    Attends [employer]ology Services: Not on file    Active Member of [employer] or Organizations: Not on file    Attends Club or Organization Meetings: Not on file    Marital Status: Not on file   Intimate Partner Violence:     Fear of Current or Ex-Partner: Not on file    Emotionally Abused: Not on file    Physically Abused: Not on file    Sexually Abused: Not on file   Housing Stability:     Unable to Pay for Housing in the Last Year: Not on file    Number of [place] in the Last Year: Not on file    Unstable Housing in the Last Year: Not on file        Family History   Problem Relation Age of Onset    Cancer Paternal Grandfather     Colon Cancer Paternal Grandmother     Cancer Paternal Grandmother         Lung    Cancer Father         Bladder ca    Hypertension Father     Breast Cancer Mother        ADVANCE DIRECTIVE: N, <no information>    Vitals:    07/06/22 1420   BP: 89/68   Site: Left Upper Arm   Position: Sitting   Cuff Size: Medium Adult   Pulse: 98   Weight: 127 lb (57.6 kg)   Height: 5' 3\" (1.6 m)     Estimated body mass index is 22.5 kg/m² as calculated from the following:    Height as of this encounter: 5' 3\" (1.6 m). Weight as of this encounter: 127 lb (57.6 kg). Physical Exam  Constitutional:       Appearance: She is well-developed. HENT:      Head: Normocephalic and atraumatic.       Right Ear: Hearing normal.      Left Ear: Hearing normal.      Nose: Nose normal.   Eyes:      General: Lids are normal.      Conjunctiva/sclera: Conjunctivae normal.      Pupils: Pupils are equal, round, and reactive to light. Cardiovascular:      Rate and Rhythm: Normal rate and regular rhythm. Heart sounds: No murmur heard. No friction rub. No gallop. Pulmonary:      Effort: Pulmonary effort is normal.      Breath sounds: Normal breath sounds. Chest:   Breasts: Breasts are symmetrical.      Right: No inverted nipple, mass, nipple discharge, skin change or tenderness. Left: No inverted nipple, mass, nipple discharge, skin change or tenderness. Abdominal:      General: Bowel sounds are normal. There is no distension. Palpations: Abdomen is soft. There is no mass. Tenderness: There is no abdominal tenderness. Genitourinary:     Labia:         Right: No rash, tenderness or lesion. Left: No rash, tenderness or lesion. Vagina: No vaginal discharge or bleeding. Rectum: No anal fissure or external hemorrhoid. Comments: Labia and vagina are atrophic. Vaginal cuff intact. vaginal walls obliterated. Vagina is shortened   Musculoskeletal:         General: Normal range of motion. Cervical back: Normal range of motion and neck supple. Comments: Normal ROM in all four extremities; normal gait   Lymphadenopathy:      Cervical: No cervical adenopathy. Skin:     General: Skin is warm and dry. Findings: No rash. Neurological:      Mental Status: She is alert and oriented to person, place, and time. Psychiatric:         Speech: Speech normal.         Behavior: Behavior normal.         No flowsheet data found. No results found for: CHOL, CHOLFAST, TRIG, TRIGLYCFAST, HDL, LDLCHOLESTEROL, LDLCALC, GLUF, GLUCOSE, LABA1C    The ASCVD Risk score (Radha Cruz, et al., 2013) failed to calculate for the following reasons:     The valid systolic blood pressure range is 90 to 200 mmHg    Cannot find a previous HDL lab    Cannot find a previous total cholesterol lab      There is no immunization history on file for this patient. Health Maintenance   Topic Date Due    COVID-19 Vaccine (1) Never done    HIV screen  Never done    Hepatitis C screen  Never done    Lipids  Never done    Breast cancer screen  Never done    Colorectal Cancer Screen  Never done    Shingles vaccine (1 of 2) Never done    Flu vaccine (1) 09/01/2022    Depression Screen  11/11/2022    DTaP/Tdap/Td vaccine (2 - Td or Tdap) 02/09/2028    Hepatitis A vaccine  Aged Out    Hepatitis B vaccine  Aged Out    Hib vaccine  Aged Out    Meningococcal (ACWY) vaccine  Aged Out    Pneumococcal 0-64 years Vaccine  Aged Out       Assessment & Plan   Well female exam with routine gynecological exam  Encounter for screening mammogram for malignant neoplasm of breast  -     AMARILIS DIGITAL SCREEN W OR WO CAD BILATERAL; Future  Postmenopausal atrophic vaginitis    The importance of self-breast examination was discussed, as well as yearly mammograms after age 36. A well balanced diet and exercise were encouraged. The risk factors for osteopenia/osteoporosis were discussed along with need for additional calcium and vitamin D. A colonoscopy is recommended at age 48 unless otherwise indicated based on symptoms or family history. The risks vs. benefits of HRT were discussed with patient and all questions answered. Pt will continue vaginal estrogen cream. Mammogram ordered and will fax to [facility]. Return in about 1 year (around 7/6/2023), or if symptoms worsen or fail to improve. Maldonado Velazquez MD, personally performed services described in this document as scribed by Vijaya Gutierrez RN in my presence, and it is both accurate and complete.

## 2022-07-19 ENCOUNTER — TELEPHONE (OUTPATIENT)
Dept: NEUROLOGY | Facility: CLINIC | Age: 52
End: 2022-07-19

## 2022-07-27 NOTE — ADDENDUM NOTE
Addended by: Elida Ovalle on: 11/11/2021 05:05 PM     Modules accepted: Level of Service Alert and oriented to person, place and time/Patient baseline mental status/Awake/Symptoms improved

## 2022-07-29 RX ORDER — ESTRADIOL 0.1 MG/G
CREAM VAGINAL
Qty: 42.5 G | Refills: 5 | Status: SHIPPED | OUTPATIENT
Start: 2022-07-29

## 2022-08-18 ENCOUNTER — HOSPITAL ENCOUNTER (OUTPATIENT)
Dept: GENERAL RADIOLOGY | Facility: HOSPITAL | Age: 52
Discharge: HOME | End: 2022-08-18
Admitting: PAIN MEDICINE

## 2022-08-18 ENCOUNTER — TRANSCRIBE ORDERS (OUTPATIENT)
Dept: ADMINISTRATIVE | Facility: HOSPITAL | Age: 52
End: 2022-08-18

## 2022-08-18 DIAGNOSIS — G89.29 OTHER CHRONIC PAIN: ICD-10-CM

## 2022-08-18 DIAGNOSIS — G89.29 CHRONIC IDIOPATHIC ANAL PAIN: Primary | ICD-10-CM

## 2022-08-18 DIAGNOSIS — K62.89 CHRONIC IDIOPATHIC ANAL PAIN: Primary | ICD-10-CM

## 2022-08-18 PROCEDURE — 72110 X-RAY EXAM L-2 SPINE 4/>VWS: CPT

## 2022-09-01 ENCOUNTER — OFFICE VISIT (OUTPATIENT)
Dept: NEUROLOGY | Age: 52
End: 2022-09-01
Payer: MEDICAID

## 2022-09-01 VITALS
RESPIRATION RATE: 16 BRPM | HEART RATE: 74 BPM | DIASTOLIC BLOOD PRESSURE: 73 MMHG | HEIGHT: 63 IN | SYSTOLIC BLOOD PRESSURE: 100 MMHG | WEIGHT: 127 LBS | BODY MASS INDEX: 22.5 KG/M2

## 2022-09-01 DIAGNOSIS — M79.7 FIBROMYALGIA: ICD-10-CM

## 2022-09-01 DIAGNOSIS — R47.89 WORD FINDING DIFFICULTY: ICD-10-CM

## 2022-09-01 DIAGNOSIS — R41.3 MEMORY LOSS: Primary | ICD-10-CM

## 2022-09-01 PROCEDURE — 99204 OFFICE O/P NEW MOD 45 MIN: CPT | Performed by: PSYCHIATRY & NEUROLOGY

## 2022-09-01 RX ORDER — ESOMEPRAZOLE MAGNESIUM 40 MG/1
CAPSULE, DELAYED RELEASE ORAL
COMMUNITY
Start: 2021-07-31

## 2022-09-01 NOTE — PROGRESS NOTES
Chief Complaint   Patient presents with    New Patient     TIA       Jing Mabry is a 45y.o. year old female who is seen for evaluation of a multitude of concerns. She relates the back in 2011 she had a stroke with left hemiparesis that all improved after 24 hours. I do not have any of those records. Since that time she complains of progressive difficulty with word finding, forgetting people's names and why she walked into the next room. She lost her job last year for multitude of reasons. She has applied for disability. She has been on B12 in the past.  She has a diagnosis of fibromyalgia and occasional orthostatic hypotension. Available records are reviewed in detail. She is on aspirin. She otherwise denies any focal logical difficulties. She does have chronic insomnia. .    Active Ambulatory Problems     Diagnosis Date Noted    No Active Ambulatory Problems     Resolved Ambulatory Problems     Diagnosis Date Noted    No Resolved Ambulatory Problems     Past Medical History:   Diagnosis Date    Chronic bronchitis (HCC)     Fibromyalgia     Hyperthyroidism     Mitral valve prolapse     MVP (mitral valve prolapse)     Stroke [facility])        Past Surgical History:   Procedure Laterality Date    CHOLECYSTECTOMY      HYSTERECTOMY (CERVIX STATUS UNKNOWN)      and removal of ovaries       Family History   Problem Relation Age of Onset    Cancer Paternal Grandfather     Colon Cancer Paternal Grandmother     Cancer Paternal Grandmother         Lung    Cancer Father         Bladder ca    Hypertension Father     Breast Cancer Mother        Allergies   Allergen Reactions    Clindamycin/Lincomycin     Sulfa Antibiotics        Social History     Socioeconomic History    Marital status:      Spouse name: Not on file    Number of children: Not on file    Years of education: Not on file    Highest education level: Not on file   Occupational History    Not on file   Tobacco Use    Smoking status: Never    Smokeless tobacco: Never   Vaping Use    Vaping Use: Never used   Substance and Sexual Activity    Alcohol use: Never    Drug use: Never    Sexual activity: Not Currently   Other Topics Concern    Not on file   Social History Narrative    Not on file     Social Determinants of Health     Financial Resource Strain: Not on file   Food Insecurity: Not on file   Transportation Needs: Not on file   Physical Activity: Not on file   Stress: Not on file   Social Connections: Not on file   Intimate Partner Violence: Not on file   Housing Stability: Not on file       Review of Systems  Constitutional: []Fever []Sweats []Chills [] Recent Injury   [x] Denies all unless marked  HENT:[]Headache  [] Head Injury  [] Sore Throat  [] Ear Pain  [] Dizziness [] Hearing Loss   [x] Denies all unless marked  Musculoskeletal: [] Arthralgia  [] Myalgias [] Muscle cramps  [] Muscle twitches   [x] Denies all unless marked   Spine:  [] Neck pain  [] Back pain  [] Sciaticia  [x] Denies all unless marked  Neurological:[] Visual Disturbance [] Double Vision [] Slurred Speech [] Trouble swallowing  [x] Vertigo [] Tingling [] Numbness [] Weakness [] Loss of Balance   [] Loss of Consciousness [] Memory Loss [] Seizures  [] Denies all unless marked  Psychiatric/Behavioral:[] Depression [] Anxiety  [x] Denies all unless marked  Sleep: []  Insomnia [] Sleep Disturbance [] Snoring [] Restless Legs [] Daytime Sleepiness [] Sleep Apnea  [x] Denies all unless marked         Current Outpatient Medications   Medication Sig Dispense Refill    esomeprazole (NEXIUM) 40 MG delayed release capsule esomeprazole magnesium 40 mg capsule,delayed release   TAKE 1 CAPSULE BY MOUTH EVERY DAY      estradiol (ESTRACE) 0.1 MG/GM vaginal cream PLACE 1 GRAM VAGINALLY TWICE A WEEK 42.5 g 5    desvenlafaxine succinate (PRISTIQ) 50 MG TB24 extended release tablet TAKE 1 TABLET BY MOUTH EVERY DAY DX:F32.9      HYDROcodone-acetaminophen (NORCO)  MG per tablet TAKE 1 TABLET BY MOUTH FOUR TIMES A DAY AS NEEDED FOR 30 DAYS      metoprolol tartrate (LOPRESSOR) 50 MG tablet 1/2 PO TID      eszopiclone (LUNESTA) 3 MG TABS eszopiclone 3 mg tablet   TAKE 1 TABLET BY MOUTH EVERY NIGHT AS NEEDED FOR SLEEP      gabapentin (NEURONTIN) 100 MG capsule gabapentin 100 mg capsule   Take 1 capsule three times daily      levothyroxine (SYNTHROID) 50 MCG tablet Take 50 mcg by mouth every other day      levothyroxine (SYNTHROID) 75 MCG tablet Take 75 mcg by mouth every other day      amitriptyline (ELAVIL) 150 MG tablet Take 150 mg by mouth nightly 100mg QHS      butalbital-acetaminophen-caffeine (FIORICET, ESGIC) -40 MG per tablet Take 1 tablet by mouth every 4 hours as needed for Headaches      aspirin 81 MG chewable tablet Take 81 mg by mouth daily       No current facility-administered medications for this visit.        Outpatient Medications Marked as Taking for the 9/1/22 encounter (Office Visit) with Jessica Salinas MD   Medication Sig Dispense Refill    esomeprazole (NEXIUM) 40 MG delayed release capsule esomeprazole magnesium 40 mg capsule,delayed release   TAKE 1 CAPSULE BY MOUTH EVERY DAY      estradiol (ESTRACE) 0.1 MG/GM vaginal cream PLACE 1 GRAM VAGINALLY TWICE A WEEK 42.5 g 5    desvenlafaxine succinate (PRISTIQ) 50 MG TB24 extended release tablet TAKE 1 TABLET BY MOUTH EVERY DAY DX:F32.9      HYDROcodone-acetaminophen (NORCO)  MG per tablet TAKE 1 TABLET BY MOUTH FOUR TIMES A DAY AS NEEDED FOR 30 DAYS      metoprolol tartrate (LOPRESSOR) 50 MG tablet 1/2 PO TID      eszopiclone (LUNESTA) 3 MG TABS eszopiclone 3 mg tablet   TAKE 1 TABLET BY MOUTH EVERY NIGHT AS NEEDED FOR SLEEP      gabapentin (NEURONTIN) 100 MG capsule gabapentin 100 mg capsule   Take 1 capsule three times daily      levothyroxine (SYNTHROID) 50 MCG tablet Take 50 mcg by mouth every other day      levothyroxine (SYNTHROID) 75 MCG tablet Take 75 mcg by mouth every other day      amitriptyline (ELAVIL) 150 MG tablet Take 150 mg by mouth nightly 100mg QHS      butalbital-acetaminophen-caffeine (FIORICET, ESGIC) -40 MG per tablet Take 1 tablet by mouth every 4 hours as needed for Headaches      aspirin 81 MG chewable tablet Take 81 mg by mouth daily         /73   Pulse 74   Resp 16   Ht 5' 3\" (1.6 m)   Wt 127 lb (57.6 kg)   BMI 22.50 kg/m²       Constitutional - well developed, well nourished. Eyes - conjunctiva normal.  Pupils react to light  Ear, nose, throat -hearing intact to finger rub No scars, masses, or lesions over external nose or ears, no atrophy of tongue  Neck-symmetric, no masses noted, no jugular vein distension. No bruits noted. Respiration- chest wall appears symmetric, good expansion,   normal effort without use of accessory muscles  Cardiovascular- RRR  Musculoskeletal - no significant wasting of muscles noted, no bony deformities, gait no gross ataxia  Extremities-no clubbing, cyanosis or edema  Skin - warm, dry, and intact. No rash, erythema, or pallor. Psychiatric - mood, affect, and behavior appear normal.      Neurological exam  Awake, alert, fluent oriented x 3 appropriate affect  Attention and concentration appear appropriate  Recent and remote memory appears unremarkable. Short-term memory 4/4 at 5 minutes. Normal clock drawing. Excellent with complex commands. Speech normal without dysarthria  No clear issues with language of fund of knowledge    Cranial Nerve Exam   CN II- Visual fields grossly unremarkable. VA adequate.  Discs sharp  CN III, IV,VI- PERRLA, EOMI, No nystagmus, conjugate eye movements, no ptosis  CN V-sensation intact to LT over face  CN VII-no facial asymmetry  CN VIII-Hearing intact   CN IX and X- Palate elevates in midline  CN XI-good shoulder shrug  CN XII-Tongue midline with no fasciculations or fibrillations    Motor Exam  V/V throughout upper and lower extremities bilaterally, no cogwheeling, normal tone    Sensory Exam  Sensation intact to light touch , PP upper and lower extremities bilaterally; normal vibration sense in LE's    Reflexes 2+ at the knees and 1+ at the ankles. Absent in the arms  No clonus ankles  No Oconnor's sign bilateral hands. No Babinski sign. Tremors- no tremors in hands or head noted    Gait  Normal base and speed  No ataxia. No Romberg sign    Coordination  Finger to nose and ALEKSANDER-unremarkable        Assessment    ICD-10-CM    1. Memory loss  R41.3 MRI BRAIN WO CONTRAST     EEG awake and asleep     Neuropsychological testing      2. Word finding difficulty  R47.89       3. Fibromyalgia  M79.7           This patient has a nonfocal neurological examination. Some somatization is suspected. I have recommended an EEG, computerized neuropsychological testing and an MRI of the brain for better evaluation. We had a long talk regarding all the above including the mind-body connection. Plan  Orders Placed This Encounter   Procedures    MRI BRAIN WO CONTRAST     Standing Status:   Future     Standing Expiration Date:   9/1/2023     Order Specific Question:   Reason for exam:     Answer:   memory loss/dementia    EEG awake and asleep     Standing Status:   Future     Standing Expiration Date:   9/1/2023     Order Specific Question:   Reason for exam:     Answer:   ams    Neuropsychological testing     Standing Status:   Future     Standing Expiration Date:   9/1/2023         Return in about 4 weeks (around 9/29/2022).     (Please note that portions of this note were completed with a voice recognition program. Efforts were made to edit the dictations but occasionally words are mis-transcribed.)

## 2022-09-14 ENCOUNTER — OFFICE VISIT (OUTPATIENT)
Dept: NEUROLOGY | Age: 52
End: 2022-09-14
Payer: MEDICAID

## 2022-09-14 DIAGNOSIS — R41.3 MEMORY LOSS: Primary | ICD-10-CM

## 2022-09-14 PROCEDURE — 96136 PSYCL/NRPSYC TST PHY/QHP 1ST: CPT | Performed by: PSYCHIATRY & NEUROLOGY

## 2022-09-14 PROCEDURE — 96137 PSYCL/NRPSYC TST PHY/QHP EA: CPT | Performed by: PSYCHIATRY & NEUROLOGY

## 2022-09-14 PROCEDURE — 99211 OFF/OP EST MAY X REQ PHY/QHP: CPT | Performed by: PSYCHIATRY & NEUROLOGY

## 2022-09-14 NOTE — PROGRESS NOTES
Chief Complaint   Patient presents with    Memory Loss       Johnny Kwong is a 45y.o. year old female who is seen for evaluation of a multitude of concerns. She relates the back in 2011 she had a stroke with left hemiparesis that all improved after 24 hours. I do not have any of those records. Since that time she complains of progressive difficulty with word finding, forgetting people's names and why she walked into the next room. She lost her job last year for multitude of reasons. She has applied for disability. She has been on B12 in the past.  She has a diagnosis of fibromyalgia and occasional orthostatic hypotension. Available records are reviewed in detail. She is on aspirin. She otherwise denies any focal logical difficulties. She does have chronic insomnia. .    Active Ambulatory Problems     Diagnosis Date Noted    No Active Ambulatory Problems     Resolved Ambulatory Problems     Diagnosis Date Noted    No Resolved Ambulatory Problems     Past Medical History:   Diagnosis Date    Chronic bronchitis (HCC)     Fibromyalgia     Hyperthyroidism     Mitral valve prolapse     MVP (mitral valve prolapse)     Stroke [facility])        Past Surgical History:   Procedure Laterality Date    CHOLECYSTECTOMY      HYSTERECTOMY (CERVIX STATUS UNKNOWN)      and removal of ovaries       Family History   Problem Relation Age of Onset    Cancer Paternal Grandfather     Colon Cancer Paternal Grandmother     Cancer Paternal Grandmother         Lung    Cancer Father         Bladder ca    Hypertension Father     Breast Cancer Mother        Allergies   Allergen Reactions    Clindamycin/Lincomycin     Sulfa Antibiotics        Social History     Socioeconomic History    Marital status:      Spouse name: Not on file    Number of children: Not on file    Years of education: Not on file    Highest education level: Not on file   Occupational History    Not on file   Tobacco Use    Smoking status: Never    Smokeless tobacco: Never   Vaping Use    Vaping Use: Never used   Substance and Sexual Activity    Alcohol use: Never    Drug use: Never    Sexual activity: Not Currently   Other Topics Concern    Not on file   Social History Narrative    Not on file     Social Determinants of Health     Financial Resource Strain: Not on file   Food Insecurity: Not on file   Transportation Needs: Not on file   Physical Activity: Not on file   Stress: Not on file   Social Connections: Not on file   Intimate Partner Violence: Not on file   Housing Stability: Not on file              Current Outpatient Medications   Medication Sig Dispense Refill    esomeprazole (NEXIUM) 40 MG delayed release capsule esomeprazole magnesium 40 mg capsule,delayed release   TAKE 1 CAPSULE BY MOUTH EVERY DAY      estradiol (ESTRACE) 0.1 MG/GM vaginal cream PLACE 1 GRAM VAGINALLY TWICE A WEEK 42.5 g 5    desvenlafaxine succinate (PRISTIQ) 50 MG TB24 extended release tablet TAKE 1 TABLET BY MOUTH EVERY DAY DX:F32.9      HYDROcodone-acetaminophen (NORCO)  MG per tablet TAKE 1 TABLET BY MOUTH FOUR TIMES A DAY AS NEEDED FOR 30 DAYS      metoprolol tartrate (LOPRESSOR) 50 MG tablet 1/2 PO TID      eszopiclone (LUNESTA) 3 MG TABS eszopiclone 3 mg tablet   TAKE 1 TABLET BY MOUTH EVERY NIGHT AS NEEDED FOR SLEEP      gabapentin (NEURONTIN) 100 MG capsule gabapentin 100 mg capsule   Take 1 capsule three times daily      levothyroxine (SYNTHROID) 50 MCG tablet Take 50 mcg by mouth every other day      levothyroxine (SYNTHROID) 75 MCG tablet Take 75 mcg by mouth every other day      amitriptyline (ELAVIL) 150 MG tablet Take 150 mg by mouth nightly 100mg QHS      butalbital-acetaminophen-caffeine (FIORICET, ESGIC) -40 MG per tablet Take 1 tablet by mouth every 4 hours as needed for Headaches      aspirin 81 MG chewable tablet Take 81 mg by mouth daily       No current facility-administered medications for this visit.        No outpatient medications have been marked as taking for the 9/14/22 encounter (Office Visit) with Serjio Ornelas MD.       There were no vitals taken for this visit. Assessment    ICD-10-CM    1. Memory loss  R41.3 OH PSYL/NRPSYCL TST PHYS/QHP 2+ TST 1ST 30 MIN     OH PSYCL/NRPSYCL TST PHYS/QHP 2+ TST EA ADDL 30 MIN          Patient is here for neuropsych testing. Anxiety and depression scales were filled out. Patient instructed on how to perform neuropsych test. Test time approximately 1 hour. Plan  Orders Placed This Encounter   Procedures    OH PSYL/NRPSYCL TST PHYS/QHP 2+ TST 1ST 30 MIN    OH PSYCL/NRPSYCL TST PHYS/QHP 2+ TST EA ADDL 30 MIN           Return in about 3 weeks (around 10/5/2022).     (Please note that portions of this note were completed with a voice recognition program. Efforts were made to edit the dictations but occasionally words are mis-transcribed.)

## 2022-09-21 ENCOUNTER — OFFICE VISIT (OUTPATIENT)
Dept: PULMONOLOGY | Facility: CLINIC | Age: 52
End: 2022-09-21

## 2022-09-21 VITALS
HEIGHT: 63 IN | BODY MASS INDEX: 21.44 KG/M2 | WEIGHT: 121 LBS | DIASTOLIC BLOOD PRESSURE: 70 MMHG | SYSTOLIC BLOOD PRESSURE: 110 MMHG | HEART RATE: 72 BPM | OXYGEN SATURATION: 97 %

## 2022-09-21 DIAGNOSIS — J41.0 SIMPLE CHRONIC BRONCHITIS: Chronic | ICD-10-CM

## 2022-09-21 DIAGNOSIS — N76.0 VAGINOSIS: ICD-10-CM

## 2022-09-21 PROCEDURE — 99213 OFFICE O/P EST LOW 20 MIN: CPT | Performed by: NURSE PRACTITIONER

## 2022-09-21 RX ORDER — METRONIDAZOLE 7.5 MG/G
GEL VAGINAL 2 TIMES DAILY
Qty: 70 G | Refills: 3 | Status: SHIPPED | OUTPATIENT
Start: 2022-09-21

## 2022-09-21 RX ORDER — AMOXICILLIN 500 MG/1
1000 CAPSULE ORAL 3 TIMES DAILY
Qty: 30 CAPSULE | Refills: 3 | Status: SHIPPED | OUTPATIENT
Start: 2022-09-21

## 2022-09-21 RX ORDER — IPRATROPIUM BROMIDE AND ALBUTEROL SULFATE 2.5; .5 MG/3ML; MG/3ML
3 SOLUTION RESPIRATORY (INHALATION) 4 TIMES DAILY PRN
Qty: 90 ML | Refills: 5 | Status: SHIPPED | OUTPATIENT
Start: 2022-09-21

## 2022-09-21 RX ORDER — ESTRADIOL 0.1 MG/G
CREAM VAGINAL
COMMUNITY
Start: 2022-07-29

## 2022-09-21 RX ORDER — BUTALBITAL, ACETAMINOPHEN AND CAFFEINE 50; 325; 40 MG/1; MG/1; MG/1
TABLET ORAL
COMMUNITY
Start: 2022-08-30

## 2022-09-21 NOTE — PROGRESS NOTES
"Chief Complaint  simple chronic bronchitis    Subjective    History of Present Illness      Marcella Carlisle presents to [facility] for:    History of Present Illness   Annual appointment for management of simple chronic bronchitis.  She uses amoxicillin as needed throughout the year.  Whenever she takes an antibiotic she requires MetroGel and nystatin for yeast.  She would like to have a nebulizer to use as needed.  She has been prescribed nebulizer machine and DuoNeb's.  She is a never smoker.  She does not take vaccines.  Objective   Vital Signs:   /70   Pulse 72   Ht 160 cm (63\")   Wt 54.9 kg (121 lb)   SpO2 97% Comment: RA  BMI 21.43 kg/m²     Physical Exam  Vitals reviewed.   Constitutional:       General: She is not in acute distress.     Appearance: Normal appearance.   HENT:      Head: Normocephalic and atraumatic.      Comments: Wearing a mask  Cardiovascular:      Rate and Rhythm: Normal rate and regular rhythm.   Pulmonary:      Effort: Pulmonary effort is normal.      Breath sounds: Normal breath sounds.   Musculoskeletal:      Cervical back: Normal range of motion.   Skin:     General: Skin is warm and dry.   Neurological:      Mental Status: She is alert and oriented to person, place, and time.   Psychiatric:         Mood and Affect: Mood normal.         Behavior: Behavior normal.        Result Review :{ Labs  Result Review  Imaging  Med Tab  Media :23}       Results for orders placed in visit on 09/04/19    Pulmonary Function Test             Assessment and Plan      Diagnoses and all orders for this visit:    1. Simple chronic bronchitis (HCC)  Comments:  Stable.  Use amoxicillin as needed.  Anytime she takes antibiotics she requires MetroGel and nystatin.  Utilize nebulizer and DuoNebs as needed.  Orders:  -     Home Nebulizer  -     ipratropium-albuterol (DUO-NEB) 0.5-2.5 mg/3 ml nebulizer; Take 3 mL by nebulization 4 (Four) Times a " Day As Needed for Wheezing or Shortness of Air.  Dispense: 90 mL; Refill: 5  -     amoxicillin (AMOXIL) 500 MG capsule; Take 2 capsules by mouth 3 (Three) Times a Day.  Dispense: 30 capsule; Refill: 3  -     nystatin (MYCOSTATIN) 100,000 unit/mL suspension; Take 5 mL by mouth 4 (Four) Times a Day.  Dispense: 60 mL; Refill: 3    2. Vaginosis  Comments:  Use MetroGel as needed.  Orders:  -     metroNIDAZOLE (METROGEL VAGINAL) 0.75 % vaginal gel; Insert  into the vagina 2 (Two) Times a Day.  Dispense: 70 g; Refill: 3        TRAMAINE Vaca  9/21/2022  15:49 CDT    Follow Up   Return in about 1 year (around 9/21/2023).    Patient was given instructions and counseling regarding her condition or for health maintenance advice. Please see specific information pulled into the AVS if appropriate.

## 2022-09-27 ENCOUNTER — HOSPITAL ENCOUNTER (OUTPATIENT)
Dept: NEUROLOGY | Age: 52
Discharge: HOME | End: 2022-09-27
Payer: MEDICAID

## 2022-09-27 ENCOUNTER — HOSPITAL ENCOUNTER (OUTPATIENT)
Dept: MRI IMAGING | Age: 52
Discharge: HOME | End: 2022-09-27
Payer: MEDICAID

## 2022-09-27 DIAGNOSIS — R41.3 MEMORY LOSS: ICD-10-CM

## 2022-09-27 PROCEDURE — 70551 MRI BRAIN STEM W/O DYE: CPT

## 2022-09-27 PROCEDURE — 95813 EEG EXTND MNTR 61-119 MIN: CPT

## 2022-09-27 PROCEDURE — 95813 EEG EXTND MNTR 61-119 MIN: CPT | Performed by: PSYCHIATRY & NEUROLOGY

## 2022-10-03 ENCOUNTER — TRANSCRIBE ORDERS (OUTPATIENT)
Dept: ADMINISTRATIVE | Facility: HOSPITAL | Age: 52
End: 2022-10-03

## 2022-10-03 DIAGNOSIS — M47.816 LUMBAR SPONDYLOSIS: Primary | ICD-10-CM

## 2022-10-04 PROBLEM — R41.3 MEMORY LOSS: Status: ACTIVE | Noted: 2022-10-04

## 2022-10-04 NOTE — PROCEDURES
[facility]                 [address] [address]                          ELECTROENCEPHALOGRAM REPORT    PATIENT NAME: Rivka lAbarado                      :        1970  MED REC NO:   [id number]                              ROOM:  ACCOUNT NO:   [de-identified]                           ADMIT DATE: 2022  PROVIDER:     Radha Hathaway MD    DATE OF EE2022    INDICATION FOR TEST:  Altered mental status. DESCRIPTION:  This is an extended EEG with a recording time of 1 hour 4  minutes and 58 seconds. The waking background consists of a rhythmic  and symmetric well-formed and well-regulated posterior dominant 8 to 9  Hz activity. During drowsiness, background frequency decreased by 2 to  3 Hz. Stage II sleep is not seen. Intermittent left greater than right  temporal slowing with some sharp waves and phase reversals are noted. IMPRESSION:  Abnormal EEG due to intermittent left greater than right  temporal slowing was sharp waves and phase reversals suggesting an  increased risk for seizures, but should be correlated clinically.         Coral Martinez MD    D: 10/04/2022 10:43:00      T: 10/04/2022 10:46:21     [email or web address]  Job#: [id number]     Doc#: [id number]    CC:

## 2022-10-05 ENCOUNTER — OFFICE VISIT (OUTPATIENT)
Dept: NEUROLOGY | Age: 52
End: 2022-10-05
Payer: MEDICAID

## 2022-10-05 VITALS
HEIGHT: 63 IN | RESPIRATION RATE: 16 BRPM | DIASTOLIC BLOOD PRESSURE: 76 MMHG | BODY MASS INDEX: 21.26 KG/M2 | SYSTOLIC BLOOD PRESSURE: 108 MMHG | HEART RATE: 76 BPM | WEIGHT: 120 LBS

## 2022-10-05 DIAGNOSIS — M79.7 FIBROMYALGIA: ICD-10-CM

## 2022-10-05 DIAGNOSIS — R47.89 WORD FINDING DIFFICULTY: ICD-10-CM

## 2022-10-05 DIAGNOSIS — R94.01 ABNORMAL EEG: ICD-10-CM

## 2022-10-05 DIAGNOSIS — R41.3 MEMORY LOSS: Primary | ICD-10-CM

## 2022-10-05 PROCEDURE — 99214 OFFICE O/P EST MOD 30 MIN: CPT | Performed by: PSYCHIATRY & NEUROLOGY

## 2022-10-05 RX ORDER — OXCARBAZEPINE 150 MG/1
150 TABLET, FILM COATED ORAL 2 TIMES DAILY
Qty: 60 TABLET | Refills: 2 | Status: SHIPPED | OUTPATIENT
Start: 2022-10-05

## 2022-10-05 NOTE — PROGRESS NOTES
Chief Complaint   Patient presents with    Follow-up    Memory Loss       Yanna Truong is a 45y.o. year old female who is seen for evaluation of a multitude of concerns. She relates the back in 2011 she had a stroke with left hemiparesis that all improved after 24 hours. I do not have any of those records. Since that time she complains of progressive difficulty with word finding, forgetting people's names and why she walked into the next room. She lost her job last year for multitude of reasons. She has applied for disability. She has been on B12 in the past.  She has a diagnosis of fibromyalgia and occasional orthostatic hypotension. Available records are reviewed in detail. She is on aspirin. She otherwise denies any focal neurological difficulties. She does have chronic insomnia. .  Her recent prolonged EEG showed intermittent left greater than right temporal slowing and sharp waves with intermittent phase reversals suggesting an increased risk for seizures. MRI of the brain unremarkable except for borderline Chiari malformation. Those films are reviewed today. No evidence of remote stroke noted. Computerized neuropsychological testing showed a significantly impaired attention and memory scores which appear out of proportion to her clinical exam.  Unclear what to make of that. More than 31 minutes were spent in the review, preparation and interpretation of this test.  We discussed all of the above.   Active Ambulatory Problems     Diagnosis Date Noted    Memory loss 10/04/2022     Resolved Ambulatory Problems     Diagnosis Date Noted    No Resolved Ambulatory Problems     Past Medical History:   Diagnosis Date    Chronic bronchitis (HCC)     Fibromyalgia     Hyperthyroidism     Mitral valve prolapse     MVP (mitral valve prolapse)     Stroke [facility])        Past Surgical History:   Procedure Laterality Date    CHOLECYSTECTOMY      HYSTERECTOMY (CERVIX STATUS UNKNOWN)      and removal of ovaries Family History   Problem Relation Age of Onset    Cancer Paternal Grandfather     Colon Cancer Paternal Grandmother     Cancer Paternal Grandmother         Lung    Cancer Father         Bladder ca    Hypertension Father     Breast Cancer Mother        Allergies   Allergen Reactions    Clindamycin/Lincomycin     Sulfa Antibiotics        Social History     Socioeconomic History    Marital status:      Spouse name: Not on file    Number of children: Not on file    Years of education: Not on file    Highest education level: Not on file   Occupational History    Not on file   Tobacco Use    Smoking status: Never    Smokeless tobacco: Never   Vaping Use    Vaping Use: Never used   Substance and Sexual Activity    Alcohol use: Never    Drug use: Never    Sexual activity: Not Currently   Other Topics Concern    Not on file   Social History Narrative    Not on file     Social Determinants of Health     Financial Resource Strain: Not on file   Food Insecurity: Not on file   Transportation Needs: Not on file   Physical Activity: Not on file   Stress: Not on file   Social Connections: Not on file   Intimate Partner Violence: Not on file   Housing Stability: Not on file       Review of SystemsConstitutional: []Fever []Sweats []Chills [] Recent Injury   [x] Denies all unless marked  HENT:[]Headache  [] Head Injury  [] Sore Throat  [] Ear Pain  [] Dizziness [] Hearing Loss   [x] Denies all unless marked  Musculoskeletal: [] Arthralgia  [] Myalgias [] Muscle cramps  [] Muscle twitches   [x] Denies all unless marked   Spine:  [] Neck pain  [] Back pain  [] Sciaticia  [x] Denies all unless marked  Neurological:[] Visual Disturbance [] Double Vision [] Slurred Speech [] Trouble swallowing  [] Vertigo [] Tingling [] Numbness [] Weakness [] Loss of Balance   [] Loss of Consciousness [x] Memory Loss [] Seizures  [] Denies all unless marked  Psychiatric/Behavioral:[] Depression [] Anxiety  [x] Denies all unless marked  Sleep: []  Insomnia [] Sleep Disturbance [] Snoring [] Restless Legs [] Daytime Sleepiness [] Sleep Apnea  [x] Denies all unless marked       Current Outpatient Medications   Medication Sig Dispense Refill    OXcarbazepine (TRILEPTAL) 150 MG tablet Take 1 tablet by mouth 2 times daily 60 tablet 2    esomeprazole (NEXIUM) 40 MG delayed release capsule esomeprazole magnesium 40 mg capsule,delayed release   TAKE 1 CAPSULE BY MOUTH EVERY DAY      estradiol (ESTRACE) 0.1 MG/GM vaginal cream PLACE 1 GRAM VAGINALLY TWICE A WEEK 42.5 g 5    desvenlafaxine succinate (PRISTIQ) 50 MG TB24 extended release tablet TAKE 1 TABLET BY MOUTH EVERY DAY DX:F32.9      HYDROcodone-acetaminophen (NORCO)  MG per tablet TAKE 1 TABLET BY MOUTH FOUR TIMES A DAY AS NEEDED FOR 30 DAYS      metoprolol tartrate (LOPRESSOR) 50 MG tablet 1/2 PO TID      eszopiclone (LUNESTA) 3 MG TABS eszopiclone 3 mg tablet   TAKE 1 TABLET BY MOUTH EVERY NIGHT AS NEEDED FOR SLEEP      gabapentin (NEURONTIN) 100 MG capsule gabapentin 100 mg capsule   Take 1 capsule three times daily      levothyroxine (SYNTHROID) 50 MCG tablet Take 50 mcg by mouth every other day      levothyroxine (SYNTHROID) 75 MCG tablet Take 75 mcg by mouth every other day      amitriptyline (ELAVIL) 150 MG tablet Take 150 mg by mouth nightly 100mg QHS      butalbital-acetaminophen-caffeine (FIORICET, ESGIC) -40 MG per tablet Take 1 tablet by mouth every 4 hours as needed for Headaches      aspirin 81 MG chewable tablet Take 81 mg by mouth daily       No current facility-administered medications for this visit.        Outpatient Medications Marked as Taking for the 10/5/22 encounter (Office Visit) with Naida Kuhn MD   Medication Sig Dispense Refill    OXcarbazepine (TRILEPTAL) 150 MG tablet Take 1 tablet by mouth 2 times daily 60 tablet 2    esomeprazole (NEXIUM) 40 MG delayed release capsule esomeprazole magnesium 40 mg capsule,delayed release   TAKE 1 CAPSULE BY MOUTH EVERY DAY      estradiol (ESTRACE) 0.1 MG/GM vaginal cream PLACE 1 GRAM VAGINALLY TWICE A WEEK 42.5 g 5    desvenlafaxine succinate (PRISTIQ) 50 MG TB24 extended release tablet TAKE 1 TABLET BY MOUTH EVERY DAY DX:F32.9      HYDROcodone-acetaminophen (NORCO)  MG per tablet TAKE 1 TABLET BY MOUTH FOUR TIMES A DAY AS NEEDED FOR 30 DAYS      metoprolol tartrate (LOPRESSOR) 50 MG tablet 1/2 PO TID      eszopiclone (LUNESTA) 3 MG TABS eszopiclone 3 mg tablet   TAKE 1 TABLET BY MOUTH EVERY NIGHT AS NEEDED FOR SLEEP      gabapentin (NEURONTIN) 100 MG capsule gabapentin 100 mg capsule   Take 1 capsule three times daily      levothyroxine (SYNTHROID) 50 MCG tablet Take 50 mcg by mouth every other day      levothyroxine (SYNTHROID) 75 MCG tablet Take 75 mcg by mouth every other day      amitriptyline (ELAVIL) 150 MG tablet Take 150 mg by mouth nightly 100mg QHS      butalbital-acetaminophen-caffeine (FIORICET, ESGIC) -40 MG per tablet Take 1 tablet by mouth every 4 hours as needed for Headaches      aspirin 81 MG chewable tablet Take 81 mg by mouth daily         /76   Pulse 76   Resp 16   Ht 5' 3\" (1.6 m)   Wt 120 lb (54.4 kg)   BMI 21.26 kg/m²       Constitutional - well developed, well nourished. Eyes - conjunctiva normal.  Pupils react to light  Ear, nose, throat -hearing intact to finger rub No scars, masses, or lesions over external nose or ears, no atrophy of tongue  Neck-symmetric, no masses noted, no jugular vein distension. No bruits noted. Respiration- chest wall appears symmetric, good expansion,   normal effort without use of accessory muscles  Cardiovascular- RRR  Musculoskeletal - no significant wasting of muscles noted, no bony deformities, gait no gross ataxia  Extremities-no clubbing, cyanosis or edema  Skin - warm, dry, and intact. No rash, erythema, or pallor.   Psychiatric - mood, affect, and behavior appear normal.      Neurological exam  Awake, alert, fluent oriented x 3 appropriate affect  Attention and concentration appear appropriate   Excellent with complex commands. Speech normal without dysarthria  No clear issues with language of fund of knowledge    Cranial Nerve Exam   CN II- Visual fields grossly unremarkable. VA adequate. CN III, IV,VI- PERRLA, EOMI, No nystagmus, conjugate eye movements, no ptosis  CN VII-no facial asymmetry  CN VIII-Hearing intact   CN IX and X- Palate elevates in midline  CN XI-good shoulder shrug  CN XII-Tongue midline with no fasciculations or fibrillations    Motor Exam  V/V throughout upper and lower extremities bilaterally, no cogwheeling, normal tone      Reflexes 2+ at the knees and 1+ at the ankles. Absent in the arms      Tremors- no tremors in hands or head noted    Gait  Normal base and speed    Coordination  Finger to nose and ALEKSANDER-unremarkable        Assessment    ICD-10-CM    1. Memory loss  R41.3       2. Word finding difficulty  R47.89       3. Fibromyalgia  M79.7       4. Abnormal EEG  R94.01             This patient has a nonfocal neurological examination. Unclear if she has an underlying seizure disorder which certainly could be causing some of her memory troubles. Symptoms atypical.  She has agreed to an empiric trial of Trileptal and was warned of potential side effects. I would also like to refer her to a [address] memory clinic for formal neuropsychological testing and their opinion on how to proceed from here. Fibromyalgia stable. Plan          Return in about 3 months (around 1/5/2023).     (Please note that portions of this note were completed with a voice recognition program. Efforts were made to edit the dictations but occasionally words are mis-transcribed.)

## 2022-10-27 ENCOUNTER — HOSPITAL ENCOUNTER (OUTPATIENT)
Dept: MRI IMAGING | Facility: HOSPITAL | Age: 52
Discharge: HOME | End: 2022-10-27
Admitting: NURSE PRACTITIONER

## 2022-10-27 DIAGNOSIS — M47.816 LUMBAR SPONDYLOSIS: ICD-10-CM

## 2022-10-27 PROCEDURE — 72148 MRI LUMBAR SPINE W/O DYE: CPT

## 2023-01-04 ENCOUNTER — OFFICE VISIT (OUTPATIENT)
Dept: NEUROLOGY | Age: 53
End: 2023-01-04
Payer: MEDICAID

## 2023-01-04 VITALS
SYSTOLIC BLOOD PRESSURE: 101 MMHG | HEART RATE: 62 BPM | BODY MASS INDEX: 20.73 KG/M2 | DIASTOLIC BLOOD PRESSURE: 68 MMHG | WEIGHT: 117 LBS | HEIGHT: 63 IN | OXYGEN SATURATION: 99 %

## 2023-01-04 DIAGNOSIS — R41.3 MEMORY LOSS: Primary | ICD-10-CM

## 2023-01-04 DIAGNOSIS — Z12.11 ENCOUNTER FOR SCREENING COLONOSCOPY: Primary | ICD-10-CM

## 2023-01-04 DIAGNOSIS — M79.7 FIBROMYALGIA: ICD-10-CM

## 2023-01-04 DIAGNOSIS — R94.01 ABNORMAL EEG: ICD-10-CM

## 2023-01-04 DIAGNOSIS — R47.89 WORD FINDING DIFFICULTY: ICD-10-CM

## 2023-01-04 PROCEDURE — 99214 OFFICE O/P EST MOD 30 MIN: CPT | Performed by: PSYCHIATRY & NEUROLOGY

## 2023-01-04 RX ORDER — OXCARBAZEPINE 300 MG/1
300 TABLET, FILM COATED ORAL 2 TIMES DAILY
Qty: 60 TABLET | Refills: 5 | Status: SHIPPED | OUTPATIENT
Start: 2023-01-04

## 2023-01-04 NOTE — PROGRESS NOTES
REVIEW OF SYSTEMS    Constitutional: []Fever []Sweats []Chills [] Recent Injury   [x] Denies all unless marked  HENT:[]Headache  [] Head Injury  [] Sore Throat  [] Ear Pain  [] Dizziness [] Hearing Loss   [x] Denies all unless marked  Musculoskeletal: [] Arthralgia  [] Myalgias [] Muscle cramps  [] Muscle twitches   [x] Denies all unless marked   Spine:  [] Neck pain  [] Back pain  [] Sciatica  [x] Denies all unless marked  Neurological:[] Visual Disturbance [] Double Vision [] Slurred Speech [] Trouble swallowing  [] Vertigo [] Tingling [] Numbness [] Weakness [] Loss of Balance   [] Loss of Consciousness [x] Memory Loss [] Seizures  [] Denies all unless marked  Psychiatric/Behavioral:[] Depression [] Anxiety  [x] Denies all unless marked  Sleep: []  Insomnia [] Sleep Disturbance [] Snoring [] Restless Legs [] Daytime Sleepiness [] Sleep Apnea  [x] Denies all unless marked

## 2023-01-04 NOTE — PROGRESS NOTES
Chief Complaint   Patient presents with    Memory Loss       Matti Ahn is a 45y.o. year old female who is seen for evaluation of a multitude of concerns. She relates that back in 2011 she had a stroke with left hemiparesis that all improved after 24 hours. I do not have any of those records. Since that time she complains of progressive difficulty with word finding, forgetting people's names and why she walked into the next room. She lost her job last year for multitude of reasons. She has applied for disability. She has been on B12 in the past.  She has a diagnosis of fibromyalgia and occasional orthostatic hypotension. Available records are reviewed in detail. She is on aspirin. She otherwise denies any focal neurological difficulties. She does have chronic insomnia. .  Her recent prolonged EEG showed intermittent left greater than right temporal slowing and sharp waves with intermittent phase reversals suggesting an increased risk for seizures. MRI of the brain unremarkable except for borderline Chiari malformation. No evidence of remote stroke noted. Computerized neuropsychological testing showed a significantly impaired attention and memory scores which appear out of proportion to her clinical exam.    On her last visit 10/22 I recommended an empiric trial of Trileptal and a referral for formal neuropsychological testing. The latter was not done. Due to her insurance it was made at [facility]. She says she has no transportation there. Taking Trileptal 150 mg twice a day has not resulted in any improvement in her memory.   Active Ambulatory Problems     Diagnosis Date Noted    Memory loss 10/04/2022     Resolved Ambulatory Problems     Diagnosis Date Noted    No Resolved Ambulatory Problems     Past Medical History:   Diagnosis Date    Chronic bronchitis (HCC)     Fibromyalgia     Hyperthyroidism     Mitral valve prolapse     MVP (mitral valve prolapse)     Stroke [facility])        Past Surgical History:   Procedure Laterality Date    CHOLECYSTECTOMY      HYSTERECTOMY (CERVIX STATUS UNKNOWN)      and removal of ovaries       Family History   Problem Relation Age of Onset    Cancer Paternal Grandfather     Colon Cancer Paternal Grandmother     Cancer Paternal Grandmother         Lung    Cancer Father         Bladder ca    Hypertension Father     Breast Cancer Mother        Allergies   Allergen Reactions    Clindamycin/Lincomycin     Sulfa Antibiotics        Social History     Socioeconomic History    Marital status:      Spouse name: Not on file    Number of children: Not on file    Years of education: Not on file    Highest education level: Not on file   Occupational History    Not on file   Tobacco Use    Smoking status: Never    Smokeless tobacco: Never   Vaping Use    Vaping Use: Never used   Substance and Sexual Activity    Alcohol use: Never    Drug use: Never    Sexual activity: Not Currently   Other Topics Concern    Not on file   Social History Narrative    Not on file     Social Determinants of Health     Financial Resource Strain: Not on file   Food Insecurity: Not on file   Transportation Needs: Not on file   Physical Activity: Not on file   Stress: Not on file   Social Connections: Not on file   Intimate Partner Violence: Not on file   Housing Stability: Not on file       Review of Systems  Constitutional: []Fever []Sweats []Chills [] Recent Injury   [x] Denies all unless marked  HENT:[]Headache  [] Head Injury  [] Sore Throat  [] Ear Pain  [] Dizziness [] Hearing Loss   [x] Denies all unless marked  Musculoskeletal: [] Arthralgia  [] Myalgias [] Muscle cramps  [] Muscle twitches   [x] Denies all unless marked   Spine:  [] Neck pain  [] Back pain  [] Sciatica  [x] Denies all unless marked  Neurological:[] Visual Disturbance [] Double Vision [] Slurred Speech [] Trouble swallowing  [] Vertigo [] Tingling [] Numbness [] Weakness [] Loss of Balance   [] Loss of Consciousness [x] Memory Loss [] Seizures  [] Denies all unless marked  Psychiatric/Behavioral:[] Depression [] Anxiety  [x] Denies all unless marked  Sleep: []  Insomnia [] Sleep Disturbance [] Snoring [] Restless Legs [] Daytime Sleepiness [] Sleep Apnea  [x] Denies all unless marked       Current Outpatient Medications   Medication Sig Dispense Refill    OXcarbazepine (TRILEPTAL) 300 MG tablet Take 1 tablet by mouth 2 times daily 60 tablet 5    esomeprazole (NEXIUM) 40 MG delayed release capsule esomeprazole magnesium 40 mg capsule,delayed release   TAKE 1 CAPSULE BY MOUTH EVERY DAY      estradiol (ESTRACE) 0.1 MG/GM vaginal cream PLACE 1 GRAM VAGINALLY TWICE A WEEK 42.5 g 5    desvenlafaxine succinate (PRISTIQ) 50 MG TB24 extended release tablet TAKE 1 TABLET BY MOUTH EVERY DAY DX:F32.9      HYDROcodone-acetaminophen (NORCO)  MG per tablet TAKE 1 TABLET BY MOUTH FOUR TIMES A DAY AS NEEDED FOR 30 DAYS      metoprolol tartrate (LOPRESSOR) 50 MG tablet 1/2 PO TID      eszopiclone (LUNESTA) 3 MG TABS eszopiclone 3 mg tablet   TAKE 1 TABLET BY MOUTH EVERY NIGHT AS NEEDED FOR SLEEP      gabapentin (NEURONTIN) 100 MG capsule gabapentin 100 mg capsule   Take 1 capsule three times daily      levothyroxine (SYNTHROID) 50 MCG tablet Take 50 mcg by mouth every other day      levothyroxine (SYNTHROID) 75 MCG tablet Take 75 mcg by mouth every other day      amitriptyline (ELAVIL) 150 MG tablet Take 150 mg by mouth nightly 100mg QHS      butalbital-acetaminophen-caffeine (FIORICET, ESGIC) -40 MG per tablet Take 1 tablet by mouth every 4 hours as needed for Headaches      aspirin 81 MG chewable tablet Take 81 mg by mouth daily       No current facility-administered medications for this visit.        Outpatient Medications Marked as Taking for the 1/4/23 encounter (Office Visit) with Rose Torres MD   Medication Sig Dispense Refill    OXcarbazepine (TRILEPTAL) 300 MG tablet Take 1 tablet by mouth 2 times daily 60 tablet 5 esomeprazole (NEXIUM) 40 MG delayed release capsule esomeprazole magnesium 40 mg capsule,delayed release   TAKE 1 CAPSULE BY MOUTH EVERY DAY      estradiol (ESTRACE) 0.1 MG/GM vaginal cream PLACE 1 GRAM VAGINALLY TWICE A WEEK 42.5 g 5    desvenlafaxine succinate (PRISTIQ) 50 MG TB24 extended release tablet TAKE 1 TABLET BY MOUTH EVERY DAY DX:F32.9      HYDROcodone-acetaminophen (NORCO)  MG per tablet TAKE 1 TABLET BY MOUTH FOUR TIMES A DAY AS NEEDED FOR 30 DAYS      metoprolol tartrate (LOPRESSOR) 50 MG tablet 1/2 PO TID      eszopiclone (LUNESTA) 3 MG TABS eszopiclone 3 mg tablet   TAKE 1 TABLET BY MOUTH EVERY NIGHT AS NEEDED FOR SLEEP      gabapentin (NEURONTIN) 100 MG capsule gabapentin 100 mg capsule   Take 1 capsule three times daily      levothyroxine (SYNTHROID) 50 MCG tablet Take 50 mcg by mouth every other day      levothyroxine (SYNTHROID) 75 MCG tablet Take 75 mcg by mouth every other day      amitriptyline (ELAVIL) 150 MG tablet Take 150 mg by mouth nightly 100mg QHS      butalbital-acetaminophen-caffeine (FIORICET, ESGIC) -40 MG per tablet Take 1 tablet by mouth every 4 hours as needed for Headaches      aspirin 81 MG chewable tablet Take 81 mg by mouth daily         /68   Pulse 62   Ht 5' 3\" (1.6 m)   Wt 117 lb (53.1 kg)   SpO2 99%   BMI 20.73 kg/m²       Constitutional - well developed, well nourished. Eyes - conjunctiva normal.  Pupils react to light  Ear, nose, throat -hearing intact to finger rub No scars, masses, or lesions over external nose or ears, no atrophy of tongue  Neck-symmetric, no masses noted, no jugular vein distension. No bruits noted. Respiration- chest wall appears symmetric, good expansion,   normal effort without use of accessory muscles  Cardiovascular- RRR  Musculoskeletal - no significant wasting of muscles noted, no bony deformities, gait no gross ataxia  Extremities-no clubbing, cyanosis or edema  Skin - warm, dry, and intact.   No rash, erythema, or pallor. Psychiatric - mood, affect, and behavior appear normal.      Neurological exam  Awake, alert, fluent oriented x 3 appropriate affect. She knew the day of the week and her most recent holiday. She can remember many things about our prior conversations. Attention and concentration appear appropriate   Excellent with complex commands. Speech normal without dysarthria  No clear issues with language of fund of knowledge    Cranial Nerve Exam   CN II- Visual fields grossly unremarkable. VA adequate. CN III, IV,VI- PERRLA, EOMI, No nystagmus, conjugate eye movements, no ptosis  CN VII-no facial asymmetry  CN VIII-Hearing intact   CN IX and X- Palate elevates in midline  CN XI-good shoulder shrug  CN XII-Tongue midline with no fasciculations or fibrillations    Motor Exam  V/V throughout upper and lower extremities bilaterally, no cogwheeling, normal tone      Reflexes 2+ at the knees and 1+ at the ankles. Absent in the arms      Tremors- no tremors in hands or head noted    Gait  Normal base and speed    Coordination  Finger to nose and ALEKSANDER-unremarkable        Assessment    ICD-10-CM    1. Memory loss  R41.3       2. Word finding difficulty  R47.89       3. Fibromyalgia  M79.7       4. Abnormal EEG  R94.01               This patient has a nonfocal neurological examination. Unclear if she has an underlying seizure disorder which certainly could be causing some of her memory troubles. Symptoms atypical.  Go ahead and increase Trileptal to 300 mg twice a day and let me know if anything gets better or worse over the next few weeks. Consider formal neuropsychological testing if needed for determination of disability, etc.  Fibromyalgia stable. Plan          Return in about 3 months (around 4/4/2023).     (Please note that portions of this note were completed with a voice recognition program. Efforts were made to edit the dictations but occasionally words are mis-transcribed.)

## 2023-02-01 NOTE — PROGRESS NOTES
Chief Complaint   Patient presents with   • Diarrhea     Change in bowels started last year diarrhea abd. Pain nausea       PCP: Howard Ovalles MD  REFER: No ref. provider found    Subjective     HPI    Marcella Carlisle presents to office with complain of loose stool, nausea, and abdominal pain since Sept 2022.  Diarrhea will occur 4-5 times per day.  Imodium provides relief for few days.  She does have abdominal cramping.  She complain of burning sensation/sharp pain in abdomen when passing stool.  She has experienced post prandial fecal urgency. No bright red blood per rectum, no melena.    Weight is stable.  She complain of frequent acid reflux despite daily compliance with Nexium.  She has upper abdominal discomfort when she bends over.        Celiac serology ordered through PCP office was negative .     ENDOSCOPY, INT (07/12/2013 00:00)    Colonoscopy apx 1995      Past Medical History:   Diagnosis Date   • Elevated cholesterol    • Fibromyalgia    • GERD (gastroesophageal reflux disease)    • Heartburn    • Stroke (HCC)        Past Surgical History:   Procedure Laterality Date   • BRONCHOSCOPY     • CHOLECYSTECTOMY     • COLONOSCOPY     • ENDOSCOPY  07/12/2013    retained food,which was un-digested in the gastric lumen   antritis   • HYSTERECTOMY         Outpatient Medications Marked as Taking for the 2/2/23 encounter (Office Visit) with Eusebio Mascorro APRN   Medication Sig Dispense Refill   • amitriptyline (ELAVIL) 50 MG tablet Take 150 mg by mouth Every Night.     • aspirin 81 MG tablet Take 81 mg by mouth Daily.     • atenolol (TENORMIN) 50 MG tablet Take 50 mg by mouth Daily.  2   • butalbital-acetaminophen-caffeine (FIORICET, ESGIC) -40 MG per tablet TAKE 1 TAB EVERY 4-6 HOURS AS NEEDED FOR HEADACHE **MAY CAUSE DROWSINESS MAX 2 TABS IN 24 HOUR     • desvenlafaxine (PRISTIQ) 50 MG 24 hr tablet TAKE 1 TABLET BY MOUTH EVERY DAY DX F32.9     • esomeprazole (nexIUM) 40 MG capsule Take 40 mg by mouth  Daily.     • estradiol (ESTRACE) 0.1 MG/GM vaginal cream PLACE 1 GRAM VAGINALLY TWICE A WEEK     • eszopiclone (LUNESTA) 2 MG tablet Take 3 mg by mouth Every Night. Take immediately before bedtime      • gabapentin (NEURONTIN) 100 MG capsule Take 100 mg by mouth 3 (Three) Times a Day.     • HYDROcodone-acetaminophen (NORCO)  MG per tablet Take 1 tablet by mouth 4 (Four) Times a Day.     • ipratropium-albuterol (DUO-NEB) 0.5-2.5 mg/3 ml nebulizer Take 3 mL by nebulization 4 (Four) Times a Day As Needed for Wheezing or Shortness of Air. 90 mL 5   • levothyroxine (SYNTHROID, LEVOTHROID) 100 MCG tablet Take 100 mcg by mouth Daily.     • levothyroxine (SYNTHROID, LEVOTHROID) 50 MCG tablet TAKE ONE TABLET BY MOUTH EVERY OTHER DAY ALTERNATING WITH 75 MCG DOSE 90 DAY SUPPLY     • metoprolol tartrate (LOPRESSOR) 50 MG tablet Take 50 mg by mouth 3 (Three) Times a Day.     • metroNIDAZOLE (METROGEL VAGINAL) 0.75 % vaginal gel Insert  into the vagina 2 (Two) Times a Day. 70 g 3   • nystatin (MYCOSTATIN) 100,000 unit/mL suspension Take 5 mL by mouth 4 (Four) Times a Day. 60 mL 3   • omeprazole (priLOSEC) 40 MG capsule Take 40 mg by mouth Daily.         Allergies   Allergen Reactions   • Clindamycin Hcl Unknown (See Comments)     Unknown   • Elemental Sulfur Unknown (See Comments)     Unknown       Social History     Socioeconomic History   • Marital status:    Tobacco Use   • Smoking status: Never   • Smokeless tobacco: Never   Vaping Use   • Vaping Use: Never used   Substance and Sexual Activity   • Alcohol use: No   • Drug use: No   • Sexual activity: Defer       Review of Systems   Constitutional: Negative for unexpected weight change.   Respiratory: Negative for shortness of breath.    Cardiovascular: Negative for chest pain.   Gastrointestinal: Positive for abdominal pain and diarrhea. Negative for anal bleeding.       Objective     Vitals:    02/02/23 1224   BP: 116/74   Pulse: 70   Temp: 97.6 °F (36.4 °C)  "  SpO2: 99%   Weight: 54.9 kg (121 lb)   Height: 160 cm (63\")     Body mass index is 21.43 kg/m².    Physical Exam  Constitutional:       Appearance: Normal appearance. She is well-developed.   Eyes:      General: No scleral icterus.  Cardiovascular:      Rate and Rhythm: Regular rhythm.      Heart sounds: Normal heart sounds. No murmur heard.  Pulmonary:      Effort: Pulmonary effort is normal. No accessory muscle usage.      Breath sounds: Normal breath sounds.   Abdominal:      General: Bowel sounds are normal. There is no distension.      Palpations: Abdomen is soft. There is no mass.      Tenderness: There is no abdominal tenderness. There is no guarding or rebound.   Skin:     General: Skin is warm and dry.      Coloration: Skin is not jaundiced.   Neurological:      Mental Status: She is alert.   Psychiatric:         Behavior: Behavior is cooperative.         Imaging Results (Most Recent)     None          Body mass index is 21.43 kg/m².    Assessment & Plan     Diagnoses and all orders for this visit:    1. Altered bowel function (Primary)  -     Case Request; Standing  -     Implement Anesthesia Orders Day of Procedure; Standing  -     Obtain Informed Consent; Standing  -     Verify bowel prep was successful; Standing  -     Give tap water enema if bowel prep was insufficient; Standing  -     Case Request    2. Gastroesophageal reflux disease, unspecified whether esophagitis present  -     Case Request; Standing  -     Implement Anesthesia Orders Day of Procedure; Standing  -     Obtain Informed Consent; Standing  -     Case Request    Other orders  -     Clenpiq 10-3.5-12 MG-GM -GM/160ML solution; Take 160 mL by mouth Take As Directed.  Dispense: 320 mL; Refill: 0         COLONOSCOPY WITH ANESTHESIA (N/A), ESOPHAGOGASTRODUODENOSCOPY WITH ANESTHESIA (N/A)    Miralax prep     Advised pt to stop use of NSAIDs, Fish Oil, and MV 5 days prior to procedure, per Dr Mota protocol.  Tylenol based products are ok " to take.  Pt verbalized understanding.    I have explained having an adequate and complete prep is associated with success of colonoscopy.   I have explained to Marcella Carlisle that not having an adequate bowel prep can lead to decreased rate of  adenoma detection, longer procedure times, and higher chance the physician will not be able to successfully complete full colonoscopy (able to intubate cecum).   I have discussed bowel prep options of clenpiq vs Miralax prep vs golytely.  Marcella Carlisle has elected to proceed with Miralax prep .   I have also discussed that there are a variety of prep options however prep that is prescribed is influenced on patient health history, how well bowels move on regular basis, and individualized insurance plan.        Eusebio Mascorro, APRN  02/02/23          Patient Instructions   Gastroesophageal Reflux Disease, Adult    Gastroesophageal reflux disease (GERD) happens when acid from your stomach flows up into the esophagus. When acid comes in contact with the esophagus, the acid causes soreness (inflammation) in the esophagus. Over time, GERD may create small holes (ulcers) in the lining of the esophagus.  CAUSES    · Increased body weight. This puts pressure on the stomach, making acid rise from the stomach into the esophagus.  · Smoking. This increases acid production in the stomach.  · Drinking alcohol. This causes decreased pressure in the lower esophageal sphincter (valve or ring of muscle between the esophagus and stomach), allowing acid from the stomach into the esophagus.  · Late evening meals and a full stomach. This increases pressure and acid production in the stomach.  · A malformed lower esophageal sphincter.  Sometimes, no cause is found.  SYMPTOMS    · Burning pain in the lower part of the mid-chest behind the breastbone and in the mid-stomach area. This may occur twice a week or more often.  · Trouble swallowing.  · Sore throat.  · Dry cough.  · Asthma-like  symptoms including chest tightness, shortness of breath, or wheezing.  DIAGNOSIS    Your caregiver may be able to diagnose GERD based on your symptoms. In some cases, X-rays and other tests may be done to check for complications or to check the condition of your stomach and esophagus.  TREATMENT    Your caregiver may recommend over-the-counter or prescription medicines to help decrease acid production. Ask your caregiver before starting or adding any new medicines.    HOME CARE INSTRUCTIONS    · Change the factors that you can control. Ask your caregiver for guidance concerning weight loss, quitting smoking, and alcohol consumption.  · Avoid foods and drinks that make your symptoms worse, such as:  ¨ Caffeine or alcoholic drinks.  ¨ Chocolate.  ¨ Peppermint or mint flavorings.  ¨ Garlic and onions.  ¨ Spicy foods.  ¨ Citrus fruits, such as oranges, sara, or limes.  ¨ Tomato-based foods such as sauce, chili, salsa, and pizza.  ¨ Fried and fatty foods.  · Avoid lying down for the 3 hours prior to your bedtime or prior to taking a nap.  · Eat small, frequent meals instead of large meals.  · Wear loose-fitting clothing. Do not wear anything tight around your waist that causes pressure on your stomach.  · Raise the head of your bed 6 to 8 inches with wood blocks to help you sleep. Extra pillows will not help.  · Only take over-the-counter or prescription medicines for pain, discomfort, or fever as directed by your caregiver.  · Do not take aspirin, ibuprofen, or other nonsteroidal anti-inflammatory drugs (NSAIDs).  SEEK IMMEDIATE MEDICAL CARE IF:    · You have pain in your arms, neck, jaw, teeth, or back.  · Your pain increases or changes in intensity or duration.  · You develop nausea, vomiting, or sweating (diaphoresis).  · You develop shortness of breath, or you faint.  · Your vomit is green, yellow, black, or looks like coffee grounds or blood.  · Your stool is red, bloody, or black.  These symptoms could be signs  of other problems, such as heart disease, gastric bleeding, or esophageal bleeding.  MAKE SURE YOU:    · Understand these instructions.  · Will watch your condition.  · Will get help right away if you are not doing well or get worse.     This information is not intended to replace advice given to you by your health care provider. Make sure you discuss any questions you have with your health care provider.     Document Released: 09/27/2006 Document Revised: 01/08/2016 Document Reviewed: 04/13/2016  [employer] Interactive Patient Education ©2016 [employer]

## 2023-02-02 ENCOUNTER — OFFICE VISIT (OUTPATIENT)
Dept: GASTROENTEROLOGY | Facility: CLINIC | Age: 53
End: 2023-02-02
Payer: MEDICAID

## 2023-02-02 VITALS
TEMPERATURE: 97.6 F | HEIGHT: 63 IN | HEART RATE: 70 BPM | OXYGEN SATURATION: 99 % | SYSTOLIC BLOOD PRESSURE: 116 MMHG | WEIGHT: 121 LBS | DIASTOLIC BLOOD PRESSURE: 74 MMHG | BODY MASS INDEX: 21.44 KG/M2

## 2023-02-02 DIAGNOSIS — K21.9 GASTROESOPHAGEAL REFLUX DISEASE, UNSPECIFIED WHETHER ESOPHAGITIS PRESENT: ICD-10-CM

## 2023-02-02 DIAGNOSIS — R19.8 ALTERED BOWEL FUNCTION: Primary | ICD-10-CM

## 2023-02-02 PROCEDURE — 99214 OFFICE O/P EST MOD 30 MIN: CPT | Performed by: NURSE PRACTITIONER

## 2023-02-02 RX ORDER — GABAPENTIN 100 MG/1
100 CAPSULE ORAL 3 TIMES DAILY
COMMUNITY

## 2023-02-02 RX ORDER — OMEPRAZOLE 40 MG/1
40 CAPSULE, DELAYED RELEASE ORAL DAILY
Status: ON HOLD | COMMUNITY
End: 2023-03-10

## 2023-02-02 RX ORDER — SODIUM PICOSULFATE, MAGNESIUM OXIDE, AND ANHYDROUS CITRIC ACID 10; 3.5; 12 MG/160ML; G/160ML; G/160ML
1 LIQUID ORAL TAKE AS DIRECTED
Qty: 320 ML | Refills: 0 | Status: ON HOLD | OUTPATIENT
Start: 2023-02-02 | End: 2023-03-10

## 2023-02-02 NOTE — PATIENT INSTRUCTIONS
Gastroesophageal Reflux Disease, Adult    Gastroesophageal reflux disease (GERD) happens when acid from your stomach flows up into the esophagus. When acid comes in contact with the esophagus, the acid causes soreness (inflammation) in the esophagus. Over time, GERD may create small holes (ulcers) in the lining of the esophagus.  CAUSES    Increased body weight. This puts pressure on the stomach, making acid rise from the stomach into the esophagus.  Smoking. This increases acid production in the stomach.  Drinking alcohol. This causes decreased pressure in the lower esophageal sphincter (valve or ring of muscle between the esophagus and stomach), allowing acid from the stomach into the esophagus.  Late evening meals and a full stomach. This increases pressure and acid production in the stomach.  A malformed lower esophageal sphincter.  Sometimes, no cause is found.  SYMPTOMS    Burning pain in the lower part of the mid-chest behind the breastbone and in the mid-stomach area. This may occur twice a week or more often.  Trouble swallowing.  Sore throat.  Dry cough.  Asthma-like symptoms including chest tightness, shortness of breath, or wheezing.  DIAGNOSIS    Your caregiver may be able to diagnose GERD based on your symptoms. In some cases, X-rays and other tests may be done to check for complications or to check the condition of your stomach and esophagus.  TREATMENT    Your caregiver may recommend over-the-counter or prescription medicines to help decrease acid production. Ask your caregiver before starting or adding any new medicines.    HOME CARE INSTRUCTIONS    Change the factors that you can control. Ask your caregiver for guidance concerning weight loss, quitting smoking, and alcohol consumption.  Avoid foods and drinks that make your symptoms worse, such as:  Caffeine or alcoholic drinks.  Chocolate.  Peppermint or mint flavorings.  Garlic and onions.  Spicy foods.  Citrus fruits, such as oranges, sara, or  limes.  Tomato-based foods such as sauce, chili, salsa, and pizza.  Fried and fatty foods.  Avoid lying down for the 3 hours prior to your bedtime or prior to taking a nap.  Eat small, frequent meals instead of large meals.  Wear loose-fitting clothing. Do not wear anything tight around your waist that causes pressure on your stomach.  Raise the head of your bed 6 to 8 inches with wood blocks to help you sleep. Extra pillows will not help.  Only take over-the-counter or prescription medicines for pain, discomfort, or fever as directed by your caregiver.  Do not take aspirin, ibuprofen, or other nonsteroidal anti-inflammatory drugs (NSAIDs).  SEEK IMMEDIATE MEDICAL CARE IF:    You have pain in your arms, neck, jaw, teeth, or back.  Your pain increases or changes in intensity or duration.  You develop nausea, vomiting, or sweating (diaphoresis).  You develop shortness of breath, or you faint.  Your vomit is green, yellow, black, or looks like coffee grounds or blood.  Your stool is red, bloody, or black.  These symptoms could be signs of other problems, such as heart disease, gastric bleeding, or esophageal bleeding.  MAKE SURE YOU:    Understand these instructions.  Will watch your condition.  Will get help right away if you are not doing well or get worse.     This information is not intended to replace advice given to you by your health care provider. Make sure you discuss any questions you have with your health care provider.     Document Released: 09/27/2006 Document Revised: 01/08/2016 Document Reviewed: 04/13/2016  [employer] Interactive Patient Education ©2016 [employer]

## 2023-02-03 PROBLEM — R19.8 ALTERED BOWEL FUNCTION: Status: ACTIVE | Noted: 2023-02-03

## 2023-02-03 PROBLEM — K21.9 GASTROESOPHAGEAL REFLUX DISEASE: Status: ACTIVE | Noted: 2023-02-03

## 2023-03-10 ENCOUNTER — TELEPHONE (OUTPATIENT)
Dept: GASTROENTEROLOGY | Facility: CLINIC | Age: 53
End: 2023-03-10
Payer: MEDICAID

## 2023-03-10 ENCOUNTER — HOSPITAL ENCOUNTER (OUTPATIENT)
Facility: HOSPITAL | Age: 53
Setting detail: HOSPITAL OUTPATIENT SURGERY
Discharge: HOME | End: 2023-03-10
Attending: INTERNAL MEDICINE | Admitting: INTERNAL MEDICINE
Payer: MEDICAID

## 2023-03-10 ENCOUNTER — ANESTHESIA (OUTPATIENT)
Dept: GASTROENTEROLOGY | Facility: HOSPITAL | Age: 53
End: 2023-03-10
Payer: MEDICAID

## 2023-03-10 ENCOUNTER — ANESTHESIA EVENT (OUTPATIENT)
Dept: GASTROENTEROLOGY | Facility: HOSPITAL | Age: 53
End: 2023-03-10
Payer: MEDICAID

## 2023-03-10 VITALS
WEIGHT: 126 LBS | BODY MASS INDEX: 23.19 KG/M2 | HEART RATE: 69 BPM | SYSTOLIC BLOOD PRESSURE: 123 MMHG | TEMPERATURE: 97.4 F | HEIGHT: 62 IN | OXYGEN SATURATION: 100 % | RESPIRATION RATE: 18 BRPM | DIASTOLIC BLOOD PRESSURE: 78 MMHG

## 2023-03-10 DIAGNOSIS — R19.8 ALTERED BOWEL FUNCTION: ICD-10-CM

## 2023-03-10 DIAGNOSIS — K21.9 GASTROESOPHAGEAL REFLUX DISEASE, UNSPECIFIED WHETHER ESOPHAGITIS PRESENT: ICD-10-CM

## 2023-03-10 PROCEDURE — 87081 CULTURE SCREEN ONLY: CPT | Performed by: INTERNAL MEDICINE

## 2023-03-10 PROCEDURE — 25010000002 PROPOFOL 10 MG/ML EMULSION: Performed by: NURSE ANESTHETIST, CERTIFIED REGISTERED

## 2023-03-10 PROCEDURE — 45380 COLONOSCOPY AND BIOPSY: CPT | Performed by: INTERNAL MEDICINE

## 2023-03-10 PROCEDURE — 43239 EGD BIOPSY SINGLE/MULTIPLE: CPT | Performed by: INTERNAL MEDICINE

## 2023-03-10 PROCEDURE — 88305 TISSUE EXAM BY PATHOLOGIST: CPT | Performed by: INTERNAL MEDICINE

## 2023-03-10 RX ORDER — PROPOFOL 10 MG/ML
VIAL (ML) INTRAVENOUS AS NEEDED
Status: DISCONTINUED | OUTPATIENT
Start: 2023-03-10 | End: 2023-03-10 | Stop reason: SURG

## 2023-03-10 RX ORDER — SODIUM CHLORIDE 0.9 % (FLUSH) 0.9 %
10 SYRINGE (ML) INJECTION AS NEEDED
Status: DISCONTINUED | OUTPATIENT
Start: 2023-03-10 | End: 2023-03-10 | Stop reason: HOSPADM

## 2023-03-10 RX ORDER — SODIUM CHLORIDE 0.9 % (FLUSH) 0.9 %
10 SYRINGE (ML) INJECTION AS NEEDED
Status: CANCELLED | OUTPATIENT
Start: 2023-03-10

## 2023-03-10 RX ORDER — SODIUM CHLORIDE 9 MG/ML
500 INJECTION, SOLUTION INTRAVENOUS CONTINUOUS PRN
Status: DISCONTINUED | OUTPATIENT
Start: 2023-03-10 | End: 2023-03-10 | Stop reason: HOSPADM

## 2023-03-10 RX ORDER — LIDOCAINE HYDROCHLORIDE 20 MG/ML
INJECTION, SOLUTION EPIDURAL; INFILTRATION; INTRACAUDAL; PERINEURAL AS NEEDED
Status: DISCONTINUED | OUTPATIENT
Start: 2023-03-10 | End: 2023-03-10 | Stop reason: SURG

## 2023-03-10 RX ORDER — SODIUM CHLORIDE 0.9 % (FLUSH) 0.9 %
10 SYRINGE (ML) INJECTION EVERY 12 HOURS SCHEDULED
Status: CANCELLED | OUTPATIENT
Start: 2023-03-10

## 2023-03-10 RX ORDER — SODIUM CHLORIDE 9 MG/ML
40 INJECTION, SOLUTION INTRAVENOUS AS NEEDED
Status: CANCELLED | OUTPATIENT
Start: 2023-03-10

## 2023-03-10 RX ORDER — SODIUM CHLORIDE 9 MG/ML
100 INJECTION, SOLUTION INTRAVENOUS CONTINUOUS
Status: CANCELLED | OUTPATIENT
Start: 2023-03-10

## 2023-03-10 RX ORDER — LIDOCAINE HYDROCHLORIDE 10 MG/ML
0.5 INJECTION, SOLUTION EPIDURAL; INFILTRATION; INTRACAUDAL; PERINEURAL ONCE AS NEEDED
Status: DISCONTINUED | OUTPATIENT
Start: 2023-03-10 | End: 2023-03-10 | Stop reason: HOSPADM

## 2023-03-10 RX ADMIN — SODIUM CHLORIDE 500 ML: 9 INJECTION, SOLUTION INTRAVENOUS at 09:58

## 2023-03-10 RX ADMIN — PROPOFOL INJECTABLE EMULSION 50 MG: 10 INJECTION, EMULSION INTRAVENOUS at 11:00

## 2023-03-10 RX ADMIN — PROPOFOL INJECTABLE EMULSION 50 MG: 10 INJECTION, EMULSION INTRAVENOUS at 10:59

## 2023-03-10 RX ADMIN — LIDOCAINE HYDROCHLORIDE 100 MG: 20 INJECTION, SOLUTION EPIDURAL; INFILTRATION; INTRACAUDAL; PERINEURAL at 10:54

## 2023-03-10 RX ADMIN — PROPOFOL INJECTABLE EMULSION 60 MG: 10 INJECTION, EMULSION INTRAVENOUS at 10:54

## 2023-03-10 RX ADMIN — PROPOFOL INJECTABLE EMULSION 40 MG: 10 INJECTION, EMULSION INTRAVENOUS at 11:03

## 2023-03-10 NOTE — ANESTHESIA PREPROCEDURE EVALUATION
Anesthesia Evaluation     Patient summary reviewed   NPO Solid Status: > 6 hours             Airway   Mallampati: II  Dental      Pulmonary    (+) COPD,   (-) not a smoker  Cardiovascular     Patient on routine beta blocker    (+) hyperlipidemia,   (-) pacemaker, past MI, cardiac stents, CABG    ROS comment: orthostasis     Neuro/Psych  (+) CVA (2011 ),    (-) seizures  GI/Hepatic/Renal/Endo    (+)  GERD,  thyroid problem hypothyroidism    Musculoskeletal     Abdominal    Substance History      OB/GYN          Other                        Anesthesia Plan    ASA 3     MAC     (Neuro workup-seizures, myoclonic tremors?, orthostasis )  intravenous induction     Anesthetic plan, risks, benefits, and alternatives have been provided, discussed and informed consent has been obtained with: patient.        CODE STATUS:

## 2023-03-10 NOTE — ANESTHESIA POSTPROCEDURE EVALUATION
"Patient: Marcella Carlisle    Procedure Summary     Date: 03/10/23 Room / Location: [facility] ENDOSCOPY 2 /  [facility] ENDOSCOPY    Anesthesia Start: 1048 Anesthesia Stop: 1114    Procedures:       COLONOSCOPY WITH ANESTHESIA      ESOPHAGOGASTRODUODENOSCOPY WITH ANESTHESIA Diagnosis:       Altered bowel function      Gastroesophageal reflux disease, unspecified whether esophagitis present      (Altered bowel function [R19.8])      (Gastroesophageal reflux disease, unspecified whether esophagitis present [K21.9])    Surgeons: Kishore Mota DO Provider: Uriel Benz CRNA    Anesthesia Type: MAC ASA Status: 3          Anesthesia Type: MAC    Vitals  No vitals data found for the desired time range.          Post Anesthesia Care and Evaluation    Patient location during evaluation: PHASE II  Patient participation: complete - patient participated  Level of consciousness: awake  Pain score: 0  Pain management: adequate    Airway patency: patent  Anesthetic complications: No anesthetic complications  PONV Status: none  Cardiovascular status: acceptable  Respiratory status: acceptable  Hydration status: acceptable    Comments: Blood pressure 112/54, pulse 77, temperature 97.4 °F (36.3 °C), temperature source Temporal, resp. rate 18, height 157.5 cm (62\"), weight 57.2 kg (126 lb), SpO2 99 %, not currently breastfeeding.        "

## 2023-03-10 NOTE — H&P
[facility] Gastroenterology  Pre Procedure History & Physical    Chief Complaint:   Change in Bowel Habits    Subjective     HPI:   Change in Bowel Habits    Past Medical History:   Past Medical History:   Diagnosis Date   • Elevated cholesterol    • Fibromyalgia    • GERD (gastroesophageal reflux disease)    • Heartburn    • Stroke (HCC)        Past Surgical History:  Past Surgical History:   Procedure Laterality Date   • BRONCHOSCOPY     • CHOLECYSTECTOMY     • COLONOSCOPY     • ENDOSCOPY  07/12/2013    retained food,which was un-digested in the gastric lumen   antritis   • HYSTERECTOMY         Family History:  Family History   Problem Relation Age of Onset   • Cancer Mother    • Cancer Father    • Hypertension Father    • Colon polyps Father    • Esophageal cancer Paternal Uncle    • Colon cancer Maternal Grandmother        Social History:   reports that she has never smoked. She has never used smokeless tobacco. She reports that she does not drink alcohol and does not use drugs.    Medications:   Prior to Admission medications    Medication Sig Start Date End Date Taking? Authorizing Provider   amitriptyline (ELAVIL) 50 MG tablet Take 3 tablets by mouth Every Night.   Yes Mavis Robles MD   butalbital-acetaminophen-caffeine (FIORICET, ESGIC) -40 MG per tablet TAKE 1 TAB EVERY 4-6 HOURS AS NEEDED FOR HEADACHE **MAY CAUSE DROWSINESS MAX 2 TABS IN 24 HOUR 8/30/22  Yes Mavis Robles MD   desvenlafaxine (PRISTIQ) 50 MG 24 hr tablet TAKE 1 TABLET BY MOUTH EVERY DAY DX F32.9 8/3/21  Yes Mavis Robles MD   esomeprazole (nexIUM) 40 MG capsule Take 1 capsule by mouth Daily. 7/31/21  Yes Mavis Robles MD   gabapentin (NEURONTIN) 100 MG capsule Take 1 capsule by mouth 3 (Three) Times a Day.   Yes Mavis Robles MD   HYDROcodone-acetaminophen (NORCO)  MG per tablet Take 1 tablet by mouth 4 (Four) Times a Day. 9/3/19  Yes Mavis Robles MD   levothyroxine  (SYNTHROID, LEVOTHROID) 100 MCG tablet Take 1 tablet by mouth Daily.   Yes Mavis Robles MD   metoprolol tartrate (LOPRESSOR) 50 MG tablet Take 1 tablet by mouth 3 (Three) Times a Day. 7/25/21  Yes Mavis Robles MD   amoxicillin (AMOXIL) 500 MG capsule Take 2 capsules by mouth 3 (Three) Times a Day. 9/21/22   Mahin Salazar APRN   aspirin 81 MG tablet Take 1 tablet by mouth Daily.    Mavis Robles MD   Cyclobenzaprine HCl (FLEXERIL PO) Take  by mouth As Needed.    Mavis Robles MD   estradiol (ESTRACE) 0.1 MG/GM vaginal cream PLACE 1 GRAM VAGINALLY TWICE A WEEK 7/29/22   Mavis Robles MD   eszopiclone (LUNESTA) 2 MG tablet Take 3 mg by mouth Every Night. Take immediately before bedtime     Mavis Robles MD   ipratropium-albuterol (DUO-NEB) 0.5-2.5 mg/3 ml nebulizer Take 3 mL by nebulization 4 (Four) Times a Day As Needed for Wheezing or Shortness of Air. 9/21/22   Mahin Salazar APRN   levothyroxine (SYNTHROID, LEVOTHROID) 50 MCG tablet TAKE ONE TABLET BY MOUTH EVERY OTHER DAY ALTERNATING WITH 75 MCG DOSE 90 DAY SUPPLY 7/29/21   Mavis Robles MD   metroNIDAZOLE (METROGEL VAGINAL) 0.75 % vaginal gel Insert  into the vagina 2 (Two) Times a Day. 9/21/22   Mahin Salazar APRN   nystatin (MYCOSTATIN) 100,000 unit/mL suspension Take 5 mL by mouth 4 (Four) Times a Day. 9/21/22   Mahin Salazar APRN   atenolol (TENORMIN) 50 MG tablet Take 1 tablet by mouth Daily. 6/21/19 3/10/23  Mavis Robles MD   Clenpiq 10-3.5-12 MG-GM -GM/160ML solution Take 160 mL by mouth Take As Directed. 2/2/23 3/10/23  Eusebio Mascorro APRN   FLUoxetine (PROzac) 20 MG capsule Take 20 mg by mouth Daily.  3/10/23  Mavis Robles MD   lansoprazole (PREVACID) 15 MG capsule Take 15 mg by mouth Daily.  3/10/23  Provider, MD Mavis   LYRICA 50 MG capsule TAKE ONE CAPSULE BY MOUTH EVERY NIGHT AT BEDTIME ** MAY CAUSE DROWSINESS  "7/26/19 3/10/23  Provider, MD Mavis   omeprazole (priLOSEC) 40 MG capsule Take 40 mg by mouth Daily.  3/10/23  Provider, MD Mavis       Allergies:  Clindamycin hcl and Sulfa antibiotics    ROS:    General: Weight stable  Resp: No SOA  Cardiovascular: No CP    Objective     Blood pressure 112/54, pulse 77, temperature 97.4 °F (36.3 °C), temperature source Temporal, resp. rate 18, height 157.5 cm (62\"), weight 57.2 kg (126 lb), SpO2 99 %, not currently breastfeeding.    Physical Exam   Constitutional: Pt is oriented to person, place, and in no distress.   HENT: Mouth/Throat: Oropharynx is clear.   Cardiovascular: Normal rate, regular rhythm.    Pulmonary/Chest: Effort normal. No respiratory distress. No  wheezes.   Abdominal: Soft. Non-distended.  Skin: Skin is warm and dry.   Psychiatric: Mood, memory, affect and judgment appear normal.     Assessment & Plan     Diagnosis:  Change in Bowel Habits    Anticipated Surgical Procedure:  E/C    The risks, benefits, and alternatives of this procedure have been discussed with the patient or the responsible party- the patient understands and agrees to proceed.        "

## 2023-03-11 LAB — UREASE TISS QL: NEGATIVE

## 2023-03-14 LAB
CYTO UR: NORMAL
LAB AP CASE REPORT: NORMAL
Lab: NORMAL
PATH REPORT.FINAL DX SPEC: NORMAL
PATH REPORT.GROSS SPEC: NORMAL

## 2023-03-16 ENCOUNTER — TELEPHONE (OUTPATIENT)
Dept: GASTROENTEROLOGY | Facility: CLINIC | Age: 53
End: 2023-03-16
Payer: MEDICAID

## 2023-03-16 NOTE — TELEPHONE ENCOUNTER
----- Message from Kishore Mota DO sent at 3/15/2023  7:00 AM CDT -----  Please let patient know small bowel biopsy was negative   Please let patient know random biopsy of colon were negative     Thank you                ----- Message -----  From: Lab, Background User  Sent: 3/11/2023   1:09 PM CDT  To: Kishore Mota DO

## 2023-08-18 ENCOUNTER — OFFICE VISIT (OUTPATIENT)
Dept: OBGYN CLINIC | Age: 53
End: 2023-08-18
Payer: MEDICAID

## 2023-08-18 VITALS
SYSTOLIC BLOOD PRESSURE: 104 MMHG | HEIGHT: 63 IN | WEIGHT: 125 LBS | DIASTOLIC BLOOD PRESSURE: 71 MMHG | HEART RATE: 83 BPM | BODY MASS INDEX: 22.15 KG/M2

## 2023-08-18 DIAGNOSIS — Z12.31 ENCOUNTER FOR SCREENING MAMMOGRAM FOR MALIGNANT NEOPLASM OF BREAST: ICD-10-CM

## 2023-08-18 DIAGNOSIS — Z12.39 SCREENING BREAST EXAMINATION: ICD-10-CM

## 2023-08-18 DIAGNOSIS — N95.2 POSTMENOPAUSAL ATROPHIC VAGINITIS: ICD-10-CM

## 2023-08-18 DIAGNOSIS — Z01.419 WELL FEMALE EXAM WITH ROUTINE GYNECOLOGICAL EXAM: Primary | ICD-10-CM

## 2023-08-18 PROCEDURE — 99396 PREV VISIT EST AGE 40-64: CPT | Performed by: NURSE PRACTITIONER

## 2023-08-18 RX ORDER — ESTRADIOL 0.1 MG/G
CREAM VAGINAL
Qty: 42.5 G | Refills: 3 | Status: SHIPPED | OUTPATIENT
Start: 2023-08-18

## 2023-08-18 ASSESSMENT — ENCOUNTER SYMPTOMS
ALLERGIC/IMMUNOLOGIC NEGATIVE: 1
GASTROINTESTINAL NEGATIVE: 1
DIARRHEA: 0
CONSTIPATION: 0
RESPIRATORY NEGATIVE: 1
EYES NEGATIVE: 1

## 2023-08-18 NOTE — PROGRESS NOTES
Arelis Blanca is a 52 y.o. female who presents today for her medical conditions/ complaints as noted below. Arelis Blanca is c/o of Annual Exam        HPI  Pt presents for annual exam. Needs order for screening mammogram. Hx of hysterectomy for severe endometriosis. Not sexually active. Doing well with Estrace cream for dryness. Mammo:-having this month  Pap smear:been a while per pt   Contraception:hyst    P:0  Ab:0  Bone density:not sure of yr   Colonoscopy:  No LMP recorded. Patient has had a hysterectomy.       Past Medical History:   Diagnosis Date    Chronic bronchitis (HCC)     Fibromyalgia     Hyperthyroidism     Mitral valve prolapse     MVP (mitral valve prolapse)     Orthostatic hypertension     Stroke [facility])      Past Surgical History:   Procedure Laterality Date    CHOLECYSTECTOMY      HYSTERECTOMY (CERVIX STATUS UNKNOWN)      and removal of ovaries     Family History   Problem Relation Age of Onset    Cancer Paternal Grandfather     Colon Cancer Paternal Grandmother     Cancer Paternal Grandmother         Lung    Cancer Father         Bladder ca    Hypertension Father     Breast Cancer Mother      Social History     Tobacco Use    Smoking status: Never    Smokeless tobacco: Never   Substance Use Topics    Alcohol use: Never       Current Outpatient Medications   Medication Sig Dispense Refill    estradiol (ESTRACE) 0.1 MG/GM vaginal cream PLACE 1 GRAM VAGINALLY TWICE A WEEK 42.5 g 3    OXcarbazepine (TRILEPTAL) 300 MG tablet Take 1 tablet by mouth 2 times daily 60 tablet 5    esomeprazole (NEXIUM) 40 MG delayed release capsule esomeprazole magnesium 40 mg capsule,delayed release   TAKE 1 CAPSULE BY MOUTH EVERY DAY      desvenlafaxine succinate (PRISTIQ) 50 MG TB24 extended release tablet TAKE 1 TABLET BY MOUTH EVERY DAY DX:F32.9      HYDROcodone-acetaminophen (NORCO)  MG per tablet TAKE 1 TABLET BY MOUTH FOUR TIMES A DAY AS NEEDED FOR 30 DAYS      metoprolol tartrate

## 2023-08-18 NOTE — PROGRESS NOTES
Pt presents today for pap smear and breast exam.      Mammo:-having this month  Pap smear:been a while per pt   Contraception:hyst    P:0  Ab:0  Bone density:not sure of yr   Colonoscopy:

## 2023-09-27 ENCOUNTER — OFFICE VISIT (OUTPATIENT)
Dept: PULMONOLOGY | Facility: CLINIC | Age: 53
End: 2023-09-27
Payer: MEDICAID

## 2023-09-27 VITALS
SYSTOLIC BLOOD PRESSURE: 110 MMHG | WEIGHT: 128 LBS | HEART RATE: 84 BPM | HEIGHT: 62 IN | DIASTOLIC BLOOD PRESSURE: 70 MMHG | OXYGEN SATURATION: 96 % | BODY MASS INDEX: 23.55 KG/M2

## 2023-09-27 DIAGNOSIS — N76.0 VAGINOSIS: ICD-10-CM

## 2023-09-27 DIAGNOSIS — J41.0 SIMPLE CHRONIC BRONCHITIS: Primary | Chronic | ICD-10-CM

## 2023-09-27 PROCEDURE — 1160F RVW MEDS BY RX/DR IN RCRD: CPT | Performed by: NURSE PRACTITIONER

## 2023-09-27 PROCEDURE — 99213 OFFICE O/P EST LOW 20 MIN: CPT | Performed by: NURSE PRACTITIONER

## 2023-09-27 PROCEDURE — 1159F MED LIST DOCD IN RCRD: CPT | Performed by: NURSE PRACTITIONER

## 2023-09-27 RX ORDER — AMOXICILLIN 500 MG/1
1000 CAPSULE ORAL 3 TIMES DAILY
Qty: 30 CAPSULE | Refills: 3 | Status: SHIPPED | OUTPATIENT
Start: 2023-09-27

## 2023-09-27 RX ORDER — ALBUTEROL SULFATE 90 UG/1
2 AEROSOL, METERED RESPIRATORY (INHALATION) EVERY 4 HOURS PRN
COMMUNITY

## 2023-09-27 RX ORDER — OXCARBAZEPINE 150 MG/1
150 TABLET, FILM COATED ORAL 2 TIMES DAILY
COMMUNITY

## 2023-09-27 RX ORDER — METRONIDAZOLE 7.5 MG/G
GEL VAGINAL DAILY
Qty: 70 G | Refills: 3 | Status: SHIPPED | OUTPATIENT
Start: 2023-09-27

## 2023-09-27 NOTE — PROGRESS NOTES
"Chief Complaint  Simple chronic bronchitis    Subjective    History of Present Illness      Marcella Carlisle presents to [facility] PULMONARY & CRITICAL CARE MEDICINE for:    History of Present Illness   Annual appointment for simple chronic bronchitis.  She is a never smoker.  She utilizes amoxicillin as needed throughout the year for bronchitis.  She is very prone to thrush and vaginitis and uses MetroGel and/or nystatin as needed.  She does not take vaccines.  She was trialed on DuoNebs last year but they made her jittery so she has been using an incentive spirometer instead which she finds very helpful.  No new or worsening complaints.  Objective   Vital Signs:   /70   Pulse 84   Ht 157.5 cm (62\")   Wt 58.1 kg (128 lb)   SpO2 96% Comment: RA  BMI 23.41 kg/m²     Physical Exam  Vitals reviewed.   Constitutional:       General: She is not in acute distress.     Appearance: Normal appearance.   HENT:      Head: Normocephalic and atraumatic.   Cardiovascular:      Rate and Rhythm: Normal rate and regular rhythm.   Pulmonary:      Effort: Pulmonary effort is normal.      Breath sounds: Normal breath sounds.   Musculoskeletal:      Cervical back: Normal range of motion.   Skin:     General: Skin is warm and dry.   Neurological:      Mental Status: She is alert and oriented to person, place, and time.   Psychiatric:         Mood and Affect: Mood normal.         Behavior: Behavior normal.      Result Review :       Results for orders placed in visit on 09/04/19    Pulmonary Function Test               Assessment and Plan      Diagnoses and all orders for this visit:    1. Simple chronic bronchitis (Primary)  Comments:  Stable.  When her bronchitis flares up she utilizes amoxicillin.  She has also been using an incentive spirometer.  The DuoNebs make her jittery.  Orders:  -     amoxicillin (AMOXIL) 500 MG capsule; Take 2 capsules by mouth 3 (Three) Times a Day.  Dispense: 30 capsule; Refill: " 3    2. Vaginosis  Comments:  Use MetroGel and/or nystatin as needed for yeast.  Orders:  -     metroNIDAZOLE (METROGEL VAGINAL) 0.75 % vaginal gel; Insert  into the vagina Daily.  Dispense: 70 g; Refill: 3  -     nystatin (MYCOSTATIN) 100,000 unit/mL suspension; Take 5 mL by mouth 4 (Four) Times a Day.  Dispense: 60 mL; Refill: 3        TRAMAINE Vaca  9/27/2023  15:23 CDT    Follow Up   Return in about 1 year (around 9/27/2024).    Patient was given instructions and counseling regarding her condition or for health maintenance advice. Please see specific information pulled into the AVS if appropriate.

## 2024-10-02 ENCOUNTER — OFFICE VISIT (OUTPATIENT)
Dept: PULMONOLOGY | Facility: CLINIC | Age: 54
End: 2024-10-02
Payer: MEDICAID

## 2024-10-02 VITALS
BODY MASS INDEX: 24.29 KG/M2 | HEIGHT: 62 IN | SYSTOLIC BLOOD PRESSURE: 102 MMHG | WEIGHT: 132 LBS | DIASTOLIC BLOOD PRESSURE: 60 MMHG | HEART RATE: 84 BPM | OXYGEN SATURATION: 96 %

## 2024-10-02 DIAGNOSIS — J41.0 SIMPLE CHRONIC BRONCHITIS: Primary | Chronic | ICD-10-CM

## 2024-10-02 DIAGNOSIS — N76.0 VAGINOSIS: ICD-10-CM

## 2024-10-02 PROCEDURE — 1159F MED LIST DOCD IN RCRD: CPT | Performed by: NURSE PRACTITIONER

## 2024-10-02 PROCEDURE — 1160F RVW MEDS BY RX/DR IN RCRD: CPT | Performed by: NURSE PRACTITIONER

## 2024-10-02 PROCEDURE — 99213 OFFICE O/P EST LOW 20 MIN: CPT | Performed by: NURSE PRACTITIONER

## 2024-10-02 RX ORDER — METRONIDAZOLE 7.5 MG/G
1 GEL VAGINAL DAILY
Qty: 70 G | Refills: 3 | Status: SHIPPED | OUTPATIENT
Start: 2024-10-02

## 2024-10-02 RX ORDER — NYSTATIN 100000 [USP'U]/ML
500000 SUSPENSION ORAL 4 TIMES DAILY
Qty: 60 ML | Refills: 3 | Status: SHIPPED | OUTPATIENT
Start: 2024-10-02

## 2024-10-02 RX ORDER — AMOXICILLIN 500 MG/1
1000 CAPSULE ORAL 3 TIMES DAILY
Qty: 30 CAPSULE | Refills: 3 | Status: SHIPPED | OUTPATIENT
Start: 2024-10-02

## 2024-10-02 NOTE — PROGRESS NOTES
"Chief Complaint  Simple chronic bronchitis    Subjective    History of Present Illness      Marcella Carlisle presents to [facility] PULMONARY & CRITICAL CARE MEDICINE for:    History of Present Illness   Annual visit for management of simple chronic bronchitis.  She is a never smoker.  She uses amoxicillin as needed when her bronchitis flares.  She has tried albuterol and/or DuoNebs in the past but they make her jittery.  She has no new or worsening pulmonary complaints.  She does not routinely take vaccines.  Objective   Vital Signs:   /60   Pulse 84   Ht 157.5 cm (62\")   Wt 59.9 kg (132 lb)   SpO2 96% Comment: RA  BMI 24.14 kg/m²     Physical Exam  Vitals reviewed.   Constitutional:       General: She is not in acute distress.     Appearance: Normal appearance.   HENT:      Head: Normocephalic and atraumatic.   Cardiovascular:      Rate and Rhythm: Normal rate and regular rhythm.   Pulmonary:      Effort: Pulmonary effort is normal.      Breath sounds: Normal breath sounds.   Musculoskeletal:      Cervical back: Normal range of motion.   Skin:     General: Skin is warm and dry.   Neurological:      Mental Status: She is alert and oriented to person, place, and time.   Psychiatric:         Mood and Affect: Mood normal.         Behavior: Behavior normal.        Result Review :       Results for orders placed in visit on 09/04/19    Pulmonary Function Test               Assessment and Plan      Diagnoses and all orders for this visit:    1. Simple chronic bronchitis (Primary)  Comments:  Stable.  When her bronchitis flares up she utilizes amoxicillin.  She also utilizes an incentive spirometer.  Flow-volume loop on return.  Orders:  -     amoxicillin (AMOXIL) 500 MG capsule; Take 2 capsules by mouth 3 (Three) Times a Day.  Dispense: 30 capsule; Refill: 3    2. Vaginosis  Comments:  Use MetroGel and/or nystatin as needed for yeast.  Orders:  -     nystatin (MYCOSTATIN) 100,000 unit/mL " suspension; Take 5 mL by mouth 4 (Four) Times a Day.  Dispense: 60 mL; Refill: 3  -     metroNIDAZOLE (METROGEL VAGINAL) 0.75 % vaginal gel; Insert 1 Applicatorful into the vagina Daily.  Dispense: 70 g; Refill: 3        TRAMAINE Vaca  10/2/2024  15:25 CDT    Follow Up   Return in about 1 year (around 10/2/2025) for FVL.    Patient was given instructions and counseling regarding her condition or for health maintenance advice. Please see specific information pulled into the AVS if appropriate.

## 2024-10-22 ENCOUNTER — OFFICE VISIT (OUTPATIENT)
Dept: NEUROLOGY | Facility: CLINIC | Age: 54
End: 2024-10-22
Payer: MEDICAID

## 2024-10-22 VITALS
HEART RATE: 78 BPM | SYSTOLIC BLOOD PRESSURE: 110 MMHG | HEIGHT: 62 IN | BODY MASS INDEX: 23.92 KG/M2 | RESPIRATION RATE: 17 BRPM | DIASTOLIC BLOOD PRESSURE: 68 MMHG | WEIGHT: 130 LBS

## 2024-10-22 DIAGNOSIS — R94.01 ABNORMAL EEG: ICD-10-CM

## 2024-10-22 DIAGNOSIS — R41.3 MEMORY LOSS: Primary | ICD-10-CM

## 2024-10-22 PROCEDURE — 1159F MED LIST DOCD IN RCRD: CPT | Performed by: PHYSICIAN ASSISTANT

## 2024-10-22 PROCEDURE — 1160F RVW MEDS BY RX/DR IN RCRD: CPT | Performed by: PHYSICIAN ASSISTANT

## 2024-10-22 PROCEDURE — 99204 OFFICE O/P NEW MOD 45 MIN: CPT | Performed by: PHYSICIAN ASSISTANT

## 2024-10-22 RX ORDER — LEVOTHYROXINE SODIUM 75 UG/1
75 TABLET ORAL DAILY
COMMUNITY

## 2024-11-11 NOTE — PROGRESS NOTES
Subjective   Marcella Carlisle is a 54 y.o. female new patient complaining of memory loss.    History of Present Illness  The patient reports that she has had persistent difficulties with functioning day-to-day with executive functions and short-term memory.  She feels that she had a stroke in 2011 however imaging is not showing any evidence of residual stroke.  The patient had previously seen neurology at [facility] and offered neuropsychiatric testing but could not travel to [facility] for this.  She had an abnormal EEG showing at risk for seizure and was placed on oxcarbazepine.  She has not noticed any change in her memory since then.  She remains on several other medications which may certainly have central nervous system effects.  She arrives for ongoing care and opinion regarding her symptoms       The following portions of the patient's history were reviewed and updated as appropriate: allergies, current medications, past family history, past medical history, past social history, past surgical history and problem list.    Review of Systems   HENT: Negative.     Eyes: Negative.    Respiratory:  Positive for shortness of breath.    Cardiovascular: Negative.    Gastrointestinal:  Positive for GERD.   Neurological:  Positive for headache.   Psychiatric/Behavioral:  Positive for decreased concentration, dysphoric mood and sleep disturbance.          Current Outpatient Medications:     amitriptyline (ELAVIL) 50 MG tablet, Take 3 tablets by mouth Every Night., Disp: , Rfl:     aspirin 81 MG tablet, Take 1 tablet by mouth Daily., Disp: , Rfl:     butalbital-acetaminophen-caffeine (FIORICET, ESGIC) -40 MG per tablet, TAKE 1 TAB EVERY 4-6 HOURS AS NEEDED FOR HEADACHE **MAY CAUSE DROWSINESS MAX 2 TABS IN 24 HOUR, Disp: , Rfl:     desvenlafaxine (PRISTIQ) 50 MG 24 hr tablet, TAKE 1 TABLET BY MOUTH EVERY DAY DX F32.9, Disp: , Rfl:     esomeprazole (nexIUM) 40 MG capsule, Take 1 capsule by mouth Daily., Disp: ,  Rfl:     estradiol (ESTRACE) 0.1 MG/GM vaginal cream, PLACE 1 GRAM VAGINALLY TWICE A WEEK, Disp: , Rfl:     eszopiclone (LUNESTA) 2 MG tablet, Take 3 mg by mouth Every Night. Take immediately before bedtime , Disp: , Rfl:     gabapentin (NEURONTIN) 100 MG capsule, Take 1 capsule by mouth 3 (Three) Times a Day., Disp: , Rfl:     HYDROcodone-acetaminophen (NORCO)  MG per tablet, Take 1 tablet by mouth 4 (Four) Times a Day., Disp: , Rfl:     ipratropium-albuterol (DUO-NEB) 0.5-2.5 mg/3 ml nebulizer, Take 3 mL by nebulization 4 (Four) Times a Day As Needed for Wheezing or Shortness of Air., Disp: 90 mL, Rfl: 5    levothyroxine (SYNTHROID, LEVOTHROID) 75 MCG tablet, Take 1 tablet by mouth Daily., Disp: , Rfl:     metoprolol tartrate (LOPRESSOR) 50 MG tablet, Take 1 tablet by mouth 3 (Three) Times a Day., Disp: , Rfl:     metroNIDAZOLE (METROGEL VAGINAL) 0.75 % vaginal gel, Insert 1 Applicatorful into the vagina Daily., Disp: 70 g, Rfl: 3    nystatin (MYCOSTATIN) 100,000 unit/mL suspension, Take 5 mL by mouth 4 (Four) Times a Day., Disp: 60 mL, Rfl: 3    OXcarbazepine (TRILEPTAL) 150 MG tablet, Take 1 tablet by mouth 2 (Two) Times a Day., Disp: , Rfl:      Objective   Physical Exam  Vitals and nursing note reviewed.   Eyes:      General: Vision grossly intact. Gaze aligned appropriately.   Cardiovascular:      Rate and Rhythm: Normal rate.   Pulmonary:      Effort: Pulmonary effort is normal.   Neurological:      Mental Status: She is alert and oriented to person, place, and time.      GCS: GCS eye subscore is 4. GCS verbal subscore is 5. GCS motor subscore is 6.      Cranial Nerves: Cranial nerves 2-12 are intact.      Sensory: Sensation is intact.      Motor: Motor function is intact.      Coordination: Coordination is intact.      Gait: Gait is intact.      Deep Tendon Reflexes: Reflexes are normal and symmetric.   Psychiatric:         Attention and Perception: Attention normal.         Mood and Affect: Mood  normal.         Speech: Speech normal.         Behavior: Behavior normal.         Cognition and Memory: Cognition normal.       MMSE score is 30 out of 30 today    I personally reviewed the MRI of the brain from 27 September 2022.  There is no evidence for stroke on my review.    Assessment & Plan   Diagnoses and all orders for this visit:    1. Memory loss (Primary)  -     EEG; Future  -     MRI Brain Without Contrast; Future  -     Ambulatory Referral to Speech Therapy for Evaluation & Treatment    2. Abnormal EEG  -     EEG; Future  -     MRI Brain Without Contrast; Future        The prognosis for improvement here is uncertain.  Certainly, this may represent a functional neurologic disorder as well.  I recommended the above testing.             Dictated utilizing Dragon dictation.

## 2024-11-25 ENCOUNTER — HOSPITAL ENCOUNTER (OUTPATIENT)
Facility: HOSPITAL | Age: 54
Discharge: HOME | End: 2024-11-25
Admitting: PHYSICIAN ASSISTANT
Payer: MEDICAID

## 2024-11-25 DIAGNOSIS — R41.89 OTHER SYMPTOMS AND SIGNS INVOLVING COGNITIVE FUNCTIONS AND AWARENESS: Primary | ICD-10-CM

## 2024-11-25 PROCEDURE — 96125 COGNITIVE TEST BY HC PRO: CPT

## 2024-11-25 NOTE — PROGRESS NOTES
Speech/Language Pathology Initial Evaluation and Plan of Care    Patient: Marcella Carlisle               : 1970  Visit Date: 2024  Referring practitioner: Victor Hugo Lee,*  Date of Initial Visit: 2024      Visit Diagnoses:    ICD-10-CM ICD-9-CM   1. Other symptoms and signs involving cognitive functions and awareness  R41.89 799.59     Past Medical History:   Diagnosis Date    Elevated cholesterol     Fibromyalgia     GERD (gastroesophageal reflux disease)     Heartburn     Stroke      Past Surgical History:   Procedure Laterality Date    BRONCHOSCOPY      CHOLECYSTECTOMY      COLONOSCOPY      COLONOSCOPY N/A 3/10/2023    Procedure: COLONOSCOPY WITH ANESTHESIA;  Surgeon: Kishore Mota DO;  Location: [facility] ENDOSCOPY;  Service: Gastroenterology;  Laterality: N/A;  pre altered bowel function  post  Dr. Ovalles    ENDOSCOPY  2013    retained food,which was un-digested in the gastric lumen   antritis    ENDOSCOPY N/A 3/10/2023    Procedure: ESOPHAGOGASTRODUODENOSCOPY WITH ANESTHESIA;  Surgeon: Kishore Mota DO;  Location: [facility] ENDOSCOPY;  Service: Gastroenterology;  Laterality: N/A;  pre gerd  post      HYSTERECTOMY         SUBJECTIVE     Patient presents with the following symptoms: word finding, forgetting names, spelling difficulties, forgetting conversations mid conversation  Date of Onset: several years after stroke in   History of present problems: stroke in   The functional impact on the patient: Patient is starting to have difficulties with ADLs (cooking, driving, word finding) without second guessing herself to make sure she did not forget to do something.   Prior level of function/education: BS in chemistry; worked in a chemical lab  Pertinent Medical History Related to this Problem: stroke in       OBJECTIVE     RBANS: The Repeatable Battery for the Assessment of Neuropsychological Status (RBANS) assesses patient function in the areas of  Immediate and Delayed memory, visuospatial/constructional skills, language and attention. It is used to detect and track neurocognitive deficits.   Index score Percentile Qualitative Description   Immediate Memory 87 19 Low Average   Visuospatial 87 19 Low Average   Language 99 47 Average   Attention 91 27 Average   Delayed Memory 68 2 Extremely Low   Total Scale 82 12 Low Average   Comments:difficulty with delayed memory       IMPRESSION/RECOMMENDATIONS  Factors Affecting Performance: n/a  Learning Motivation: strong  Education/Learning Comments:   Patient demonstrated understanding of evaluation results and plan of care.   Clinical Impression   Impact on Function: Patient is starting to have minimal difficulties with performing ADLs when it comes to driving and remembering how to get places without GPS, following recipes, and forgetting conversations. She is utilizing strategies to cope with cognitive deficits.   Prognosis Comment: good based on willingness to improve cognition      Therapy Education/Self Care    Details: Memory assessment   Given home strategies   Progress: New   Education provided to:  Patient   Level of understanding Verbalized           Total Time of Visit:            60   mins    ASSESSMENT/PLAN     ASSESSMENT:   Patient is not indicated for skilled care by a Speech/Language Pathologist.     Summary of Assessment: Patient presents low average for her age, but having difficulties with delayed memory that are starting to impact her daily.      PLAN:  Details of Plan of Care: return to referring doctor for further instruction    SPEECH/LANGUAGE PATHOLOGIST SIGNATURE: Yolanda AVILES CCC-SLP  [place] License #: [id number]  Electronically Signed on 11/25/2024      Thank you so much for letting us work with Marcella. I appreciate your letting us work with your patients. If you have any questions or concerns, please don't hesitate to contact me.    [facility]  Outpatient Therapy Services  [facility] [place] [address]  [phone]

## 2024-12-02 ENCOUNTER — HOSPITAL ENCOUNTER (OUTPATIENT)
Dept: NEUROLOGY | Facility: HOSPITAL | Age: 54
Discharge: HOME | End: 2024-12-02
Admitting: PHYSICIAN ASSISTANT
Payer: MEDICAID

## 2024-12-02 DIAGNOSIS — R41.3 MEMORY LOSS: ICD-10-CM

## 2024-12-02 DIAGNOSIS — R94.01 ABNORMAL EEG: ICD-10-CM

## 2024-12-02 PROCEDURE — 95819 EEG AWAKE AND ASLEEP: CPT

## 2024-12-03 PROCEDURE — 95816 EEG AWAKE AND DROWSY: CPT | Performed by: STUDENT IN AN ORGANIZED HEALTH CARE EDUCATION/TRAINING PROGRAM

## 2025-01-31 ENCOUNTER — HOSPITAL ENCOUNTER (OUTPATIENT)
Dept: MRI IMAGING | Facility: HOSPITAL | Age: 55
Discharge: HOME | End: 2025-01-31
Payer: MEDICAID

## 2025-01-31 DIAGNOSIS — R94.01 ABNORMAL EEG: ICD-10-CM

## 2025-01-31 DIAGNOSIS — R41.3 MEMORY LOSS: ICD-10-CM

## 2025-01-31 PROCEDURE — 70551 MRI BRAIN STEM W/O DYE: CPT

## 2025-02-03 ENCOUNTER — TELEPHONE (OUTPATIENT)
Dept: NEUROLOGY | Facility: CLINIC | Age: 55
End: 2025-02-03
Payer: MEDICAID

## 2025-02-03 NOTE — TELEPHONE ENCOUNTER
Provider: MEGAN BOYLE    Caller: Marcella Carlisle    Relationship to Patient: Self    Phone Number: [phone]        Reason for Call: PT RETURNED CALL TO R/S HER F/U FROM 02/05 DUE TO PROVIDER OUT, SCHEDULED FOR NEXT AVAILABLE 06/11 AND ADDED TO WAITLIST, HOWEVER PT WOULD LIKE A CALL BACK TO RECEIVE EEG RESULTS FROM 12/02 AND BRAIN MRI RESULTS FROM 01/31/25.    When was the patient last seen: 11/11/24      PLEASE REVIEW AND ADVISE

## 2025-02-11 ENCOUNTER — TELEPHONE (OUTPATIENT)
Dept: NEUROLOGY | Facility: CLINIC | Age: 55
End: 2025-02-11
Payer: MEDICAID

## 2025-02-11 NOTE — TELEPHONE ENCOUNTER
Caller: PEGGY OLIVEROS    Relationship: PATIENT    Best call back number:  [phone]    Caller requesting test results:      What test was performed:  MRI AND EEG    When was the test performed:    MRI 1/31/25  EEG  12/2/24    Where was the test performed:      MRI - [facility]   EEG [facility] NEURO ROOM 1    Additional notes:

## 2025-02-11 NOTE — TELEPHONE ENCOUNTER
----- Message from Victor Hugo Lee sent at 2/10/2025  4:10 PM CST -----  MRI and EEG are normal.  ----- Message -----  From: Neida Avalos LPN  Sent: 2/10/2025  12:52 PM CST  To: MEGAN Montiel    Her appointment was rescheduled, she would like MRI and EEG results.

## 2025-03-13 ENCOUNTER — TELEPHONE (OUTPATIENT)
Dept: NEUROLOGY | Facility: CLINIC | Age: 55
End: 2025-03-13
Payer: MEDICAID

## 2025-03-13 NOTE — TELEPHONE ENCOUNTER
Caller: Marcella Carlsile    Relationship: Self    Best call back number: [phone]      Caller requesting test results: PATIENT    What test was performed: Adult Speech Cognitive Initial     When was the test performed: 11/25/2024 (11 hours)    Where was the test performed: [facility]     PLEASE REVIEW

## 2025-03-13 NOTE — TELEPHONE ENCOUNTER
Pt requested the records of her memory eval done at speech therapy be sent to her 's office. I explained that she would need to contact the the therapy office to have those formally sent.   Understanding was voiced. Pt to call back with any further questions/.

## 2025-06-11 ENCOUNTER — TELEPHONE (OUTPATIENT)
Dept: NEUROLOGY | Facility: CLINIC | Age: 55
End: 2025-06-11
Payer: MEDICAID

## (undated) DEVICE — THE CHANNEL CLEANING BRUSH IS A NYLON FLEXI BRUSH ATTACHED TO A FLEXIBLE PLASTIC SHEATH DESIGNED TO SAFELY REMOVE DEBRIS FROM FLEXIBLE ENDOSCOPES.

## (undated) DEVICE — SENSR O2 OXIMAX FNGR A/ 18IN NONSTR

## (undated) DEVICE — CUFF,BP,DISP,1 TUBE,ADULT,HP: Brand: MEDLINE

## (undated) DEVICE — Device: Brand: DEFENDO AIR/WATER/SUCTION AND BIOPSY VALVE

## (undated) DEVICE — FRCP BX RADJAW4 NDL 2.8 240 STD OG

## (undated) DEVICE — MASK,OXYGEN,MED CONC,ADLT,7' TUB, UC: Brand: PENDING

## (undated) DEVICE — YANKAUER,BULB TIP WITH VENT: Brand: ARGYLE

## (undated) DEVICE — CONMED SCOPE SAVER BITE BLOCK, 20X27 MM: Brand: SCOPE SAVER